# Patient Record
Sex: FEMALE | Race: WHITE | Employment: STUDENT | URBAN - METROPOLITAN AREA
[De-identification: names, ages, dates, MRNs, and addresses within clinical notes are randomized per-mention and may not be internally consistent; named-entity substitution may affect disease eponyms.]

---
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
## 2023-05-05 ENCOUNTER — OFFICE VISIT (OUTPATIENT)
Dept: FAMILY MEDICINE CLINIC | Facility: CLINIC | Age: 18
End: 2023-05-05
Payer: COMMERCIAL

## 2023-05-05 VITALS
WEIGHT: 160 LBS | HEIGHT: 70 IN | BODY MASS INDEX: 22.9 KG/M2 | SYSTOLIC BLOOD PRESSURE: 122 MMHG | RESPIRATION RATE: 16 BRPM | DIASTOLIC BLOOD PRESSURE: 84 MMHG | HEART RATE: 89 BPM | OXYGEN SATURATION: 99 %

## 2023-05-05 DIAGNOSIS — N92.6 IRREGULAR MENSES: ICD-10-CM

## 2023-05-05 DIAGNOSIS — N94.6 SEVERE MENSTRUAL CRAMPS: ICD-10-CM

## 2023-05-05 DIAGNOSIS — M41.25 IDIOPATHIC SCOLIOSIS OF THORACOLUMBAR REGION: ICD-10-CM

## 2023-05-05 DIAGNOSIS — B37.0 ORAL THRUSH: Primary | ICD-10-CM

## 2023-05-05 PROCEDURE — 99214 OFFICE O/P EST MOD 30 MIN: CPT | Performed by: PHYSICIAN ASSISTANT

## 2023-05-05 PROCEDURE — 3074F SYST BP LT 130 MM HG: CPT | Performed by: PHYSICIAN ASSISTANT

## 2023-05-05 PROCEDURE — 3079F DIAST BP 80-89 MM HG: CPT | Performed by: PHYSICIAN ASSISTANT

## 2023-05-05 PROCEDURE — 3008F BODY MASS INDEX DOCD: CPT | Performed by: PHYSICIAN ASSISTANT

## 2023-05-05 RX ORDER — FLUCONAZOLE 150 MG/1
TABLET ORAL
Qty: 2 TABLET | Refills: 0 | Status: SHIPPED | OUTPATIENT
Start: 2023-05-05

## 2023-05-05 RX ORDER — ACETAMINOPHEN AND CODEINE PHOSPHATE 120; 12 MG/5ML; MG/5ML
0.35 SOLUTION ORAL DAILY
Qty: 90 TABLET | Refills: 3 | Status: SHIPPED | OUTPATIENT
Start: 2023-05-05 | End: 2024-05-04

## 2023-05-05 NOTE — PROGRESS NOTES
Subjective:   Patient ID: Jose David Ignacio is a 21year old female. HPI   Patient presents c/o a few symptoms:    Dry mouth for 2 weeks  Feeling run down  Had before but never lasted this long  Feels like a lemon zester on her tongue  No other pain  Worse with eating crusty bread  Nothing helping like mouthwash or lozenges  No recent abx  Wears retainers    For the last several months, cycle is 1-1.5 weeks longer  Having safe sex  Gets worried that it is late  First day is extremely crampy, cannot get out of bed  Cramps lasting longer  Not heavier  No clots  Getting very bloated and constipation - getting worse  Acne is worse too  Did not like nuvaring - made her very depressed  Open to trying something new    Would like repeat xray for h/o scoliosis, last one was 2015        History/Other:   Review of Systems   Constitutional: Negative for chills, diaphoresis and fever. HENT: Positive for mouth sores (dryness and discomfort of tongue and throat). Genitourinary: Positive for menstrual problem. No current outpatient medications on file. Allergies:No Known Allergies    Objective:   Physical Exam  Vitals and nursing note reviewed. Constitutional:       Appearance: Normal appearance. HENT:      Head: Normocephalic and atraumatic. Right Ear: External ear normal.      Left Ear: External ear normal.      Mouth/Throat:      Mouth: Mucous membranes are moist.      Pharynx: Oropharyngeal exudate (yellow/white plaques of the tongue) and posterior oropharyngeal erythema (oropharynx, palate, buccal mucosa) present. Neurological:      Mental Status: She is alert. Assessment & Plan:   1. Oral thrush  Treat with medication as below. Follow up if not soon better. - fluconazole 150 MG Oral Tab; Take 1 tablet by mouth once. Repeat in 72 hours if symptoms persist.  Dispense: 2 tablet; Refill: 0    2. Irregular menses  3. Severe menstrual cramps  Could not tolerate nuvaring. Give trial of mini pill. Medication use and side effects discussed. Follow up in 3 months. Sooner prn.   - Norethindrone 0.35 MG Oral Tab; Take 1 tablet (0.35 mg total) by mouth daily. Dispense: 90 tablet; Refill: 3    4. Idiopathic scoliosis of thoracolumbar region  Repeat xray. Refer to ortho or for PT if needed. - XR SCOLIOSIS SPINE 2-3 VIEWS (CPT=72082);  Future

## 2023-05-11 ENCOUNTER — OFFICE VISIT (OUTPATIENT)
Dept: ENDOCRINOLOGY CLINIC | Facility: CLINIC | Age: 18
End: 2023-05-11
Payer: COMMERCIAL

## 2023-05-11 ENCOUNTER — TELEPHONE (OUTPATIENT)
Dept: ENDOCRINOLOGY CLINIC | Facility: CLINIC | Age: 18
End: 2023-05-11

## 2023-05-11 VITALS
BODY MASS INDEX: 22.3 KG/M2 | HEIGHT: 70 IN | HEART RATE: 93 BPM | WEIGHT: 155.81 LBS | SYSTOLIC BLOOD PRESSURE: 126 MMHG | DIASTOLIC BLOOD PRESSURE: 70 MMHG | OXYGEN SATURATION: 98 % | RESPIRATION RATE: 17 BRPM

## 2023-05-11 DIAGNOSIS — E10.65 TYPE 1 DIABETES MELLITUS WITH HYPERGLYCEMIA (HCC): Primary | ICD-10-CM

## 2023-05-11 LAB
CARTRIDGE LOT#: 587 NUMERIC
CREAT UR-SCNC: 138 MG/DL
HEMOGLOBIN A1C: 11.8 % (ref 4.3–5.6)
MICROALBUMIN UR-MCNC: 2.92 MG/DL
MICROALBUMIN/CREAT 24H UR-RTO: 21.2 UG/MG (ref ?–30)

## 2023-05-11 PROCEDURE — 99215 OFFICE O/P EST HI 40 MIN: CPT | Performed by: NURSE PRACTITIONER

## 2023-05-11 PROCEDURE — 82043 UR ALBUMIN QUANTITATIVE: CPT | Performed by: NURSE PRACTITIONER

## 2023-05-11 PROCEDURE — 82570 ASSAY OF URINE CREATININE: CPT | Performed by: NURSE PRACTITIONER

## 2023-05-11 PROCEDURE — 3008F BODY MASS INDEX DOCD: CPT | Performed by: NURSE PRACTITIONER

## 2023-05-11 PROCEDURE — 3074F SYST BP LT 130 MM HG: CPT | Performed by: NURSE PRACTITIONER

## 2023-05-11 PROCEDURE — 3078F DIAST BP <80 MM HG: CPT | Performed by: NURSE PRACTITIONER

## 2023-05-11 PROCEDURE — 83036 HEMOGLOBIN GLYCOSYLATED A1C: CPT | Performed by: NURSE PRACTITIONER

## 2023-05-11 RX ORDER — GLUCAGON INJECTION, SOLUTION 1 MG/.2ML
1 INJECTION, SOLUTION SUBCUTANEOUS ONCE AS NEEDED
Qty: 0.4 ML | Refills: 0 | Status: SHIPPED | OUTPATIENT
Start: 2023-05-11 | End: 2023-05-11

## 2023-05-11 RX ORDER — PEN NEEDLE, DIABETIC 32GX 5/32"
NEEDLE, DISPOSABLE MISCELLANEOUS
COMMUNITY
Start: 2023-05-09

## 2023-05-11 RX ORDER — BLOOD-GLUCOSE METER
EACH MISCELLANEOUS
COMMUNITY
Start: 2023-05-09

## 2023-05-11 RX ORDER — LANCETS
EACH MISCELLANEOUS
COMMUNITY
Start: 2023-05-09

## 2023-05-11 RX ORDER — INSULIN GLARGINE 100 [IU]/ML
INJECTION, SOLUTION SUBCUTANEOUS
COMMUNITY
Start: 2023-05-09

## 2023-05-11 RX ORDER — PERPHENAZINE 16 MG/1
TABLET, FILM COATED ORAL
COMMUNITY
Start: 2023-05-09

## 2023-05-11 RX ORDER — INSULIN LISPRO 100 [IU]/ML
INJECTION, SOLUTION INTRAVENOUS; SUBCUTANEOUS
COMMUNITY
Start: 2023-05-09

## 2023-05-11 RX ORDER — BLOOD-GLUCOSE SENSOR
1 EACH MISCELLANEOUS
Qty: 3 EACH | Refills: 11 | Status: SHIPPED | OUTPATIENT
Start: 2023-05-11

## 2023-05-11 NOTE — PROGRESS NOTES
A continuous glucose sensor for 24 hour blood sugar monitoring was placed on patient today per [employer] order. Serial NUMBER:[id number] Exp date: (6/30/24)  - After cleansing skin with alcohol prep, Sensor (G7)was inserted on  Right  Posterior upper arm area without difficulty. Small amount of skin prep was added around sensor tape after placement to help with sensor adhesive. Area remains free of any bleeding or irritation or pain at site when patient left DM center. Patient instructions:   Record daily food/drink intake and activity in log book  Call Diabetes center with any questions or concerns.    instructed to record food, exercise, insulin doses (if taking) on log provided

## 2023-05-11 NOTE — PATIENT INSTRUCTIONS
We are here to support you with Diabetes but please remember that you still need your primary care doctor for your routine health maintenance. Your current A1C: 11.8%  This test provides us with your average blood sugar for the past 3 months. The main goal of diabetes treatment is to keep your sugar from going too high. We measure your overall blood sugar trends with a Hemoglobin A1C test. (also called an A1C)  For most people the target is less than 7.0% but sometimes we make exceptions based on age, health history and other factors. Keeping an A1C less than 7% helps prevents diabetes related health problems. If your A1C goes too high, then we need to talk about changing your current diabetes treatment. MEDICATIONS:   It is important to take all of your medications as prescribed. Please call me if you cannot get the prescriptions filled or are having issues with refills. Also, please call me if you have any issues with medication questions, side effects, dosing questions or problems with your blood sugar trends BEFORE CHANGING OR STOPPING ANY MEDICATIONS. Continue Lantus 17 units injection 2x/day   Continue Humalog ICR 1u:15g and ISF 1u:50 mg/dl for target of 140 mg/dl    Blood sugar testing:   Always bring your glucose meter or blood sugar logbook to every appointment here at the diabetes center. This allows me to safely make adjustments to your diabetes plan. In order for me to determine any patterns in your blood sugars, you will need to use personal continuous glucose monitor or test your blood sugar 4 times daily   Call if 2 readings below 80 mg/dl in 1 week for medication adjustment. Blood sugar targets:  Before breakfast:   (preferably < 110)  2 hours After meals: less than 180 (preferably less than 150)   Call for persistent blood sugars < 75 or > 200. Blood sugars greater than 200 are not acceptable to reach your goal of improving diabetes      Health Maintenance:   1.  LABS: It is important to monitor your kidney function (blood and urine protein levels) , liver function tests and cholesterol levels when you have diabetes. 2. FOOT EXAMS:  daily foot inspections for foot wounds or skin changes are important for foot care. Any unusual changes should be reported immediately. 3. EYE EXAMS: Checking your eyes for diabetes changes is important and you should have a dilated eye exam done by an eye doctor EVERY year since these changes occur in the BACK of the eye and not visible by you. Please let me know if you need provider list for eye doctor. Lifestyle Therapy:    1. NUTRITION: Maintain optimal weight, calorie restriction if overweight, plant-based diet    2. PHYSICAL ACTIVITY: 150 minutes per week (30 minutes a day 5 days a week) of moderate exertion such as walking, stair climbing. Include strength training 2-3 times per week. Increase as tolerated. 3. SLEEP: Try and get 7-8 hours of sleep per night    4. BEHAVIOR:  Tobacco cessation and alcohol in moderation      Reminders:  Meet with Russell as scheduled  Meet with Biju Ugalde as scheduled    Brenda Company, BC-[facility], [facility]  [address], [address], [address], [address] [facility], [address]  [address]  Ella Vickers, [address] [place] [address]  Diabetes Services at [address] [facility] [address]

## 2023-05-18 ENCOUNTER — NURSE ONLY (OUTPATIENT)
Dept: ENDOCRINOLOGY CLINIC | Facility: CLINIC | Age: 18
End: 2023-05-18
Payer: COMMERCIAL

## 2023-05-18 VITALS — BODY MASS INDEX: 24 KG/M2 | WEIGHT: 166.38 LBS

## 2023-05-18 DIAGNOSIS — E10.9 TYPE 1 DIABETES MELLITUS WITHOUT COMPLICATION (HCC): Primary | ICD-10-CM

## 2023-05-18 PROCEDURE — G0108 DIAB MANAGE TRN  PER INDIV: HCPCS | Performed by: DIETITIAN, REGISTERED

## 2023-05-19 ENCOUNTER — DIABETIC EDUCATION (OUTPATIENT)
Dept: ENDOCRINOLOGY CLINIC | Facility: CLINIC | Age: 18
End: 2023-05-19
Payer: COMMERCIAL

## 2023-05-19 DIAGNOSIS — E10.65 TYPE 1 DIABETES MELLITUS WITH HYPERGLYCEMIA (HCC): ICD-10-CM

## 2023-05-19 PROCEDURE — 97802 MEDICAL NUTRITION INDIV IN: CPT

## 2023-05-19 NOTE — PATIENT INSTRUCTIONS
Recommendations:    Don't restrict but continue to follow a consistent carbohydrate diet of around 60 carbs per meal and increase as needed to match activity level. 3 meals daily with 2-3 snacks. Use measuring cups/spoons or a food scale at home to monitor portion sizes  Read food labels and use Calorie Anny Blue when counting carbs  As you return to physical activity start slow with moderate workouts and build up as your comfort level with your blood sugars grow  Delay physical activity with blood sugars over 300 mg/dL or with moderate to large ketones (purchase Ketone strips)  Blood glucose targets prior to exercise recommended to be 126-180 mg/dL   Consume 15-20 grams of carbohydrates before and after workout  Keep glucose tabs or fast acting carbohydrates with you for treatment of lows; for blood glucose levels less than 70 mg/dl, take 15 grams of fast acting carbohydrates (3-4 glucose tabs, 4 ounces of juice, or as discussed). Retest in 15 min. If not above 70 mg/dl, retreat. Purchase glucose tablets    Medications:  Continue to follow SHAWN Pope's instructions:  Lantus 17 units injection bid. Humalog ICR 1u: 15g and ISF 1u:50 mg/dl for target of 140 mg/dl. (Do not use the sliding scale)  Before vigorous physical activity do not correct blood sugars but dose for any carbs eaten  Your blood sugars will spike during your activity, do not dose right after your work out as your blood sugars will decrease on their own.

## 2023-05-20 ENCOUNTER — MOBILE ENCOUNTER (OUTPATIENT)
Dept: ENDOCRINOLOGY CLINIC | Facility: CLINIC | Age: 18
End: 2023-05-20

## 2023-05-20 RX ORDER — PERPHENAZINE 16 MG/1
TABLET, FILM COATED ORAL
Refills: 0 | OUTPATIENT
Start: 2023-05-20

## 2023-05-20 RX ORDER — BLOOD-GLUCOSE SENSOR
1 EACH MISCELLANEOUS
Qty: 3 EACH | Refills: 1 | Status: SHIPPED | OUTPATIENT
Start: 2023-05-20

## 2023-05-20 RX ORDER — LANCETS
EACH MISCELLANEOUS
Refills: 0 | OUTPATIENT
Start: 2023-05-20

## 2023-05-22 ENCOUNTER — NURSE ONLY (OUTPATIENT)
Dept: ENDOCRINOLOGY CLINIC | Facility: CLINIC | Age: 18
End: 2023-05-22
Payer: COMMERCIAL

## 2023-05-22 ENCOUNTER — TELEPHONE (OUTPATIENT)
Dept: ENDOCRINOLOGY CLINIC | Facility: CLINIC | Age: 18
End: 2023-05-22

## 2023-05-22 RX ORDER — BLOOD-GLUCOSE SENSOR
1 EACH MISCELLANEOUS
Qty: 3 EACH | Refills: 11 | Status: SHIPPED | OUTPATIENT
Start: 2023-05-22

## 2023-05-22 NOTE — TELEPHONE ENCOUNTER
Pt scheduled for 1pm nurse visit today in [facility] office to  [address] [facility] sample

## 2023-05-22 NOTE — TELEPHONE ENCOUNTER
Pt requesting Dexcom sample. States there is an issue with insurance and she has been unable to  from pharmacy. I will contat pharmacy to request PA information and begin PA process for future refils. Okay for pt to  sample today? She can  at NP or SPF location.

## 2023-05-23 ENCOUNTER — TELEMEDICINE (OUTPATIENT)
Dept: ENDOCRINOLOGY CLINIC | Facility: CLINIC | Age: 18
End: 2023-05-23
Payer: COMMERCIAL

## 2023-05-23 DIAGNOSIS — E10.65 TYPE 1 DIABETES MELLITUS WITH HYPERGLYCEMIA (HCC): Primary | ICD-10-CM

## 2023-05-23 PROCEDURE — 95251 CONT GLUC MNTR ANALYSIS I&R: CPT | Performed by: NURSE PRACTITIONER

## 2023-05-23 PROCEDURE — 99213 OFFICE O/P EST LOW 20 MIN: CPT | Performed by: NURSE PRACTITIONER

## 2023-05-23 NOTE — PATIENT INSTRUCTIONS
Decrease Lantus to 15 units injection 2x/day with breakfast and dinner    Decrease Humalog ICR to 1u:17g and ISF 1u:55 mg/dl for target of 140 mg/dl    Continue to use Dexcom G7 CGM for glucose monitoring    Contact office if 2 readings less than 80 mg/dl in one week for insulin adjustment

## 2023-05-23 NOTE — PROGRESS NOTES
Pt paged on call service this weekend asking for dexcom refill stating she called the office on Friday. Rx sent to pharmacy but reminded pt of giving the office at least 48 hours to fill rx which is not done on weekends. Pt verbalized understanding.

## 2023-05-24 RX ORDER — PEN NEEDLE, DIABETIC 32GX 5/32"
NEEDLE, DISPOSABLE MISCELLANEOUS
Qty: 100 EACH | Refills: 3 | Status: SHIPPED | OUTPATIENT
Start: 2023-05-24

## 2023-05-26 ENCOUNTER — APPOINTMENT (OUTPATIENT)
Dept: ENDOCRINOLOGY CLINIC | Facility: CLINIC | Age: 18
End: 2023-05-26
Payer: COMMERCIAL

## 2023-05-26 ENCOUNTER — TELEPHONE (OUTPATIENT)
Dept: ENDOCRINOLOGY CLINIC | Facility: CLINIC | Age: 18
End: 2023-05-26

## 2023-05-26 NOTE — TELEPHONE ENCOUNTER
Pt called regarding lows during the day and overnight. Spoke to Andres ESCOBAR and reviewed pt's Dexcom daily report. Pt was informed to lower Basal from 15 units BID to 12 units BID. Also discussed eating before and after vigorous activity. Pt also was given a box of 50 pen needles. The pharmacy told her that the previous MD from the hospital placed a date she could not refill by on the prescription. Report scanned into media.     Edson Root RDN, LDN, [facility]  Certified Diabetes Care and   Registered Dietitian

## 2023-05-30 ENCOUNTER — MED REC SCAN ONLY (OUTPATIENT)
Dept: FAMILY MEDICINE CLINIC | Facility: CLINIC | Age: 18
End: 2023-05-30

## 2023-06-01 ENCOUNTER — TELEPHONE (OUTPATIENT)
Dept: ENDOCRINOLOGY CLINIC | Facility: CLINIC | Age: 18
End: 2023-06-01

## 2023-06-01 NOTE — TELEPHONE ENCOUNTER
Pt called and asked for sample G7. Said she is having trouble with her [address] [facility] getting covered because \"hospital gave wrong diagnosis\" as T2DM, but she's T1D. I explained CAB sent new Rx to [facility] yesterday and that associated dx is listed as T1D, so she should call pharmacy to see if this goes through. If not, advised her to call back. Pt agreed.

## 2023-06-01 NOTE — TELEPHONE ENCOUNTER
Called [facility] to complete PA for Dexcom G7 6/1/23 thru 6/1/24 Auth [id number]. Called [place] to confirm Dexcom G7 is running through correctly. Confirmed G7 is approved and called patient to let her know. No sample needed. Pt will  at pharmacy.

## 2023-06-01 NOTE — TELEPHONE ENCOUNTER
Pt called back after speaking to the pharmacy and Dexcom G7 is still going through approval process. Pt G7 sensor expires today and she is requesting to  sample in NP office. Okay to provide sample? I will call the pharmacy to see what is needed from our office to get [address] [place] [address] approved through insurance.

## 2023-06-02 ENCOUNTER — TELEMEDICINE (OUTPATIENT)
Dept: ENDOCRINOLOGY CLINIC | Facility: CLINIC | Age: 18
End: 2023-06-02
Payer: COMMERCIAL

## 2023-06-02 ENCOUNTER — PATIENT MESSAGE (OUTPATIENT)
Dept: ENDOCRINOLOGY CLINIC | Facility: CLINIC | Age: 18
End: 2023-06-02

## 2023-06-02 DIAGNOSIS — E10.9 TYPE 1 DIABETES MELLITUS WITHOUT COMPLICATION (HCC): Primary | ICD-10-CM

## 2023-06-02 PROCEDURE — 99213 OFFICE O/P EST LOW 20 MIN: CPT | Performed by: NURSE PRACTITIONER

## 2023-06-02 RX ORDER — INSULIN GLARGINE 100 [IU]/ML
INJECTION, SOLUTION SUBCUTANEOUS
Qty: 45 ML | Refills: 0 | COMMUNITY
Start: 2023-06-02

## 2023-06-08 ENCOUNTER — TELEPHONE (OUTPATIENT)
Dept: ENDOCRINOLOGY CLINIC | Facility: CLINIC | Age: 18
End: 2023-06-08

## 2023-06-08 NOTE — TELEPHONE ENCOUNTER
Received [facility] for pump attached 2 OV notes labs for A1C and 30 days of glusocse reading.  Faxed off too [phone] confirmed   Sending to scan

## 2023-06-19 ENCOUNTER — TELEPHONE (OUTPATIENT)
Dept: ENDOCRINOLOGY CLINIC | Facility: CLINIC | Age: 18
End: 2023-06-19

## 2023-06-19 NOTE — TELEPHONE ENCOUNTER
Letter of Medical Necessity written, printed and signed by provider. To be faxed to Joseline Floyd at [phone].     Vicenet BOWEN, BC-ADM, [facility]

## 2023-06-26 ENCOUNTER — PATIENT MESSAGE (OUTPATIENT)
Dept: ENDOCRINOLOGY CLINIC | Facility: CLINIC | Age: 18
End: 2023-06-26

## 2023-06-26 RX ORDER — PROCHLORPERAZINE 25 MG/1
1 SUPPOSITORY RECTAL
Qty: 1 EACH | Refills: 3 | Status: SHIPPED | OUTPATIENT
Start: 2023-06-26

## 2023-06-26 RX ORDER — PROCHLORPERAZINE 25 MG/1
1 SUPPOSITORY RECTAL
Qty: 3 EACH | Refills: 11 | Status: SHIPPED | OUTPATIENT
Start: 2023-06-26

## 2023-06-26 NOTE — TELEPHONE ENCOUNTER
Pt going on tslim pump, dexcom G6 supplies pended. LOV: 06/02/2023  Future Appointments   Date Time Provider Julio Cesar Olivares   7/14/2023  2:15 PM SHAWN Pederson [facility]   7/28/2023  9:00 AM JESSI Lowry [facility] [address]     Last A1c value was 11.8% done 5/11/2023.

## 2023-06-28 ENCOUNTER — PATIENT MESSAGE (OUTPATIENT)
Dept: FAMILY MEDICINE CLINIC | Facility: CLINIC | Age: 18
End: 2023-06-28

## 2023-06-28 DIAGNOSIS — Z13.0 ENCOUNTER FOR SCREENING FOR HEMATOLOGIC DISORDER: Primary | ICD-10-CM

## 2023-06-29 NOTE — TELEPHONE ENCOUNTER
Pt's pump shipped - sending pump setting form to Elena to be completed. Requesting completed form be faxed to Sherrell Munroe at [phone]. Send copy to scan and email scan back to me. Sherrell Munroe will work on arranging pump training.

## 2023-06-30 NOTE — TELEPHONE ENCOUNTER
Pump setting orders completed by provider. Will fax to Ignacia Argueta on Monday when return to office.     Arianna Garcia APRN, BC-ADM, [facility]

## 2023-07-10 ENCOUNTER — NURSE ONLY (OUTPATIENT)
Dept: ENDOCRINOLOGY CLINIC | Facility: CLINIC | Age: 18
End: 2023-07-10
Payer: COMMERCIAL

## 2023-07-11 ENCOUNTER — PATIENT MESSAGE (OUTPATIENT)
Dept: ENDOCRINOLOGY CLINIC | Facility: CLINIC | Age: 18
End: 2023-07-11

## 2023-07-11 NOTE — TELEPHONE ENCOUNTER
Contacted pt to discuss issues with blood sugar fluctuations occurring overnight. Reviewed most recent Tandem Tslim pump download with JESSI Guerra - recommended setting adjustments:    Basal Rate: Current  Adjustment  Midnight 0.580 u/hr 0.50 u/hr  Carb Ratio 1u:17 g  1u:34 g    Discussed adjustments with pt and walked her through over the phone how to make the setting adjustments as recommended above. Had pt read back setting changes to me. Also discussed using the Exercise Activity feature 1 hour before exercise. Encouraged her to continue to allow the pump/system to have time to work over the next few days. Advised her to contact our office if she experiences frequent high or low blood sugars.

## 2023-07-13 ENCOUNTER — TELEMEDICINE (OUTPATIENT)
Dept: ENDOCRINOLOGY CLINIC | Facility: CLINIC | Age: 18
End: 2023-07-13
Payer: COMMERCIAL

## 2023-07-13 DIAGNOSIS — E10.9 TYPE 1 DIABETES MELLITUS WITHOUT COMPLICATION (HCC): Primary | ICD-10-CM

## 2023-07-13 DIAGNOSIS — Z96.41 INSULIN PUMP IN PLACE: ICD-10-CM

## 2023-07-13 PROCEDURE — 95251 CONT GLUC MNTR ANALYSIS I&R: CPT | Performed by: NURSE PRACTITIONER

## 2023-07-13 PROCEDURE — 99213 OFFICE O/P EST LOW 20 MIN: CPT | Performed by: NURSE PRACTITIONER

## 2023-07-13 NOTE — PROGRESS NOTES
HPI:   Nora Ayala is a 21year old female who presents virtually for the management of her diabetes. This visit is conducted using Telemedicine with live, two way, interactive video and audio. Patient verbally consents to Telemedicine visit. Patient understands and accepts financial responsibility for any deductible, co-insurance and/or co-pays associated with this service. Chief Complaint: Diabetes Follow Up    Patient is using continuous glucose monitoring or would be testing BGs at least 4 times per day. Patient is on at least 3 insulin injections per day. Patient is making self-adjustments in insulin according to blood sugars or carbs. Diabetes: needs improvement  Type: 1   Duration:newly diagnosed, May 2023  Current Meds: Humalog via insulin pump, Lantus for insulin pump back up plan, Gvoke for tx of severe hypoglycemia  Complications: Hospitalizations for blood glucose, DKA     Tandem T-slim Insulin pump w/Control IQ  Control IQ Use: 74%    Basal rates:   12MN: 0.5 units/hour     Bolus settings:   Max bolus: 10 units     Carbohydrate ratio:   12MN: 1u:34g    Insulin sensitivity:  12MN: 1u:63 mg/dl    BG target:  12MN: 110 mg/dl    Active insulin: 5 hours     TDD insulin: 6.16 units   Basal: 3.87 units (63%)  Bolus: 2.07 units (34%)  Average daily carbs: 47g    Personal  Dexcom CGM  Analysis of data: 6/30/2023 - 7/13/2023  % Time CGM is Active: 98.8%  Sensor download: full report  in media  Average glucose : 127 mg/dl     Standard deviation: 32 mg/dl   CV (coefficient of variation) : 25.1%     8% time above 180mg /dl   0% time above 250 mg/dl  92% time in target range:  mg/dl   0% time below target range: 70mg/dl     Evaluation   1. Increased likelihood of hypoglycemia postprandial  2. Overnight glucose mostly stable and in target range  3. Minimal postprandial elevation  4.  Normal glucose variability      Overall glucose control:   HGBA1C:    Lab Results   Component Value Date    A1C 11.8 (A) 05/11/2023          Wt Readings from Last 3 Encounters:  05/18/23 : 166 lb 6.4 oz (75.5 kg) (91 %, Z= 1.32)*  05/11/23 : 155 lb 12.8 oz (70.7 kg) (85 %, Z= 1.05)*  05/05/23 : 160 lb (72.6 kg) (88 %, Z= 1.16)*    * Growth percentiles are based on [facility] (Girls, 2-20 Years) data. Past History:   She  has a past medical history of Type 1 diabetes mellitus ([facility] [address] Her family history includes Diabetes in her paternal grandmother; Thyroid Disorder in her maternal grandmother. She  reports that she has never smoked. She has never used smokeless tobacco. She reports that she does not drink alcohol and does not use drugs. She has No Known Allergies. Continuous Blood Gluc Transmit (DEXCOM G6 TRANSMITTER) Does not apply Misc, 1 each every 3 (three) months. Continuous Blood Gluc Sensor (DEXCOM G6 SENSOR) Does not apply Misc, 1 each Every 10 days. insulin glargine (LANTUS SOLOSTAR) 100 UNIT/ML Subcutaneous Solution Pen-injector, Inject daily as directed up to TDD = 30 units  FOR INSULIN PUMP BACK UP ONLY  Insulin Lispro, 1 Unit Dial, (HUMALOG KWIKPEN) 100 UNIT/ML Subcutaneous Solution Pen-injector, Uses up to 50 units daily as directed based on carb intake and blood sugars at meals/snacks  [DISCONTINUED] Continuous Blood Gluc Sensor (DEXCOM G7 SENSOR) Does not apply Misc, 1 each Every 10 days. TRUEPLUS 5-BEVEL PEN NEEDLES 32G X 4 MM Does not apply Misc, Use as directed  Microlet Lancets Does not apply Misc,   CONTOUR NEXT TEST In Vitro Strip,   Blood Glucose Monitoring Suppl (CONTOUR NEXT EZ) w/Device Does not apply Kit,   glucagon (GVOKE HYPOPEN 2-PACK) 1 MG/0.2ML Subcutaneous SUBQ injection, Inject 0.2 mL (1 mg total) into the skin once as needed for Low blood glucose (as needed for treatment of severe hypoglycemia). Norethindrone 0.35 MG Oral Tab, Take 1 tablet (0.35 mg total) by mouth daily. No current facility-administered medications on file prior to visit.       CMP  (most recent labs)   No results found for: GLU, BUN, CREATSERUM, GFRNAA, GFRAA, EGFRCR, CA, ALKPHO, AST, ALT, BILT, TP, ALB, NA, K, CL, CO2        Lipids  (most recent labs)   No results found for: CHOLEST, TRIG, HDL, LDL, NONHDLC       Diabetes  (most recent labs)   Lab Results   Component Value Date/Time    A1C 11.8 (A) 05/11/2023 08:27 AM          Microalb (most recent labs)   Lab Results   Component Value Date/Time    MICROALBCREA 21.2 05/11/2023 09:31 AM        Lab results reviewed with patient. REVIEW OF SYSTEMS:   GENERAL HEALTH: feels well otherwise  SKIN: denies any unusual skin lesions or rashes  RESPIRATORY: denies shortness of breath with exertion  CARDIOVASCULAR: denies chest pain on exertion  GI: denies abdominal pain and denies heartburn  NEURO: denies headaches     EXAM:   Physical Exam  Pulmonary:      Comments: Able to speak in complete sentences without difficulty  Neurological:      Mental Status: She is alert and oriented to person, place, and time. Psychiatric:         Mood and Affect: Mood normal.         Behavior: Behavior normal.         ASSESSMENT AND PLAN:   Diabetes: Patient to continue Humalog via insulin pump, Lantus for insulin pump back up plan and Gvoke for treatment of severe hypoglycemia. Adjustments to insulin pump settings:  Tandem T-slim Insulin pump w/Control IQ  Basal rates:   12MN: 0.45 units/hour (decreased from 0.5 units/hour)     Bolus settings:   Max bolus: 10 units     Carbohydrate ratio:   12MN: 1u:35g (decreased from 1u:34g)    Insulin sensitivity:  12MN: 1u: 65 mg/dl (decreased from 1u:63 mg/dl)    BG target:  12MN: 110 mg/dl    Active insulin: 5 hours     Insulin pump back up plan: Give Lantus 10 units injection daily and continue Humalog ICR 1u:35g and ISF 1u:65 mg/dl for target of 110 mg/dl. For mechanical issues contact [employer]. Patient to continue to use personal Dexcom CGM for glucose monitoring. Discussed self monitoring blood glucose and the importance of monitoring.   Patient agreed to monitoring qid - before meals and 2 hours postprandial of one meal per day if not using CGM. Hypoglycemia S&S, Rx, and when to call APRN/CDE reviewed using Rule of 15's. Stressed need to call if 2 readings below 80 mg/dl in 1 week for medication adjustment. Reinforced healthy eating in healthy portions and increasing daily physical activity. DM Health Maintenance  Last dilated eye exam: No data recorded Exam shows retinopathy? No data recorded  Last diabetic foot exam: No data recorded  Date of last PHQ-2 depression screen: No data recorded  Patient  reports that she has never smoked. She has never used smokeless tobacco.  When is flu vaccine due? No recommendations at this time  When is pneumonia vaccine due? Pneumococcal Vaccination(1 - PPSV23 if available, else PCV20) due on 03/15/2005    The patient indicates understanding of these issues and agrees to the plan. Refills addressed at time of office visit. Diagnoses and all orders for this visit:    Type 1 diabetes mellitus without complication (HCC)    Insulin pump in place       Return in about 8 days (around 7/21/2023) for Personal CGM, Insulin pump, 45 minute appointment, Virtual Visit. The risks and benefits of my recommendations, as well as other treatment options were discussed with the patient today. questions were answered to the best of my knowledge. Patient verbalizes understanding and amendable to plan of care.   Duration of this service:  16 minutes   Rony BOWEN, BC-ADM, [facility]

## 2023-07-14 ENCOUNTER — LAB ENCOUNTER (OUTPATIENT)
Dept: LAB | Age: 18
End: 2023-07-14
Attending: FAMILY MEDICINE
Payer: COMMERCIAL

## 2023-07-14 DIAGNOSIS — Z13.0 ENCOUNTER FOR SCREENING FOR HEMATOLOGIC DISORDER: ICD-10-CM

## 2023-07-14 LAB — HGB S BLD QL SOLY: NEGATIVE

## 2023-07-14 PROCEDURE — 36415 COLL VENOUS BLD VENIPUNCTURE: CPT

## 2023-07-14 PROCEDURE — 85660 RBC SICKLE CELL TEST: CPT

## 2023-07-19 DIAGNOSIS — N92.6 IRREGULAR MENSES: ICD-10-CM

## 2023-07-19 DIAGNOSIS — N94.6 SEVERE MENSTRUAL CRAMPS: ICD-10-CM

## 2023-07-20 RX ORDER — PEN NEEDLE, DIABETIC 32GX 5/32"
NEEDLE, DISPOSABLE MISCELLANEOUS
Qty: 100 EACH | Refills: 3 | Status: SHIPPED | OUTPATIENT
Start: 2023-07-20

## 2023-07-20 RX ORDER — ACETAMINOPHEN AND CODEINE PHOSPHATE 120; 12 MG/5ML; MG/5ML
0.35 SOLUTION ORAL DAILY
Qty: 90 TABLET | Refills: 0 | Status: SHIPPED | OUTPATIENT
Start: 2023-07-20 | End: 2024-07-19

## 2023-07-20 RX ORDER — PROCHLORPERAZINE 25 MG/1
1 SUPPOSITORY RECTAL
Qty: 3 EACH | Refills: 11 | Status: SHIPPED | OUTPATIENT
Start: 2023-07-20

## 2023-07-20 NOTE — TELEPHONE ENCOUNTER
Requested Prescriptions     Pending Prescriptions Disp Refills    Continuous Blood Gluc Sensor (DEXCOM G6 SENSOR) Does not apply Misc 3 each 11     Si each Every 10 days. Future Appointments   Date Time Provider Julio Cesar Elise   2023  4:15 PM SHAWN Huff [facility]   2023  1:00 PM Charlie Amato RD [facility] [address]   2023  9:00 AM JESSI De La Torre [facility] [address]     Last A1c value was 11.8% done 2023.   Refill 23  LOV 23

## 2023-07-20 NOTE — TELEPHONE ENCOUNTER
A refill request was received for:  Requested Prescriptions     Pending Prescriptions Disp Refills    TRUEPLUS 5-BEVEL PEN NEEDLES 32G X 4 MM Does not apply Misc 100 each 3     Sig: Use as directed       Last refill date:5/24/2023       Last office visit:7/27/2023     Follow up due:  Future Appointments   Date Time Provider Julio Cesar Olivares   7/21/2023  4:15 PM SHAWN Huff [facility]   7/27/2023  1:00 PM Charlie Amato RD [facility] [address]   7/28/2023  9:00 AM JESSI De La Torre [facility] [address] [facility] [address]

## 2023-07-21 ENCOUNTER — TELEMEDICINE (OUTPATIENT)
Dept: ENDOCRINOLOGY CLINIC | Facility: CLINIC | Age: 18
End: 2023-07-21
Payer: COMMERCIAL

## 2023-07-21 DIAGNOSIS — Z96.41 INSULIN PUMP IN PLACE: ICD-10-CM

## 2023-07-21 DIAGNOSIS — E10.9 TYPE 1 DIABETES MELLITUS WITHOUT COMPLICATION (HCC): Primary | ICD-10-CM

## 2023-07-21 PROCEDURE — 99213 OFFICE O/P EST LOW 20 MIN: CPT | Performed by: NURSE PRACTITIONER

## 2023-07-21 NOTE — PROGRESS NOTES
HPI:   Herber Townsend is a 21year old female who presents virtually for the management of her diabetes. This visit is conducted using Telemedicine with live, two way, interactive video and audio. Patient verbally consents to Telemedicine visit. Patient understands and accepts financial responsibility for any deductible, co-insurance and/or co-pays associated with this service. Chief Complaint: Diabetes Follow Up    Patient is using continuous glucose monitoring or would be testing BGs at least 4 times per day. Patient is on at least 3 insulin injections per day. Patient is making self-adjustments in insulin according to blood sugars or carbs. Diabetes: needs improvement  Type: 1   Duration:newly diagnosed, May 2023  Current Meds: Humalog via insulin pump, Lantus for insulin pump back up plan, Gvoke for tx of severe hypoglycemia  Complications: Hospitalizations for blood glucose, DKA     Tandem T-slim Insulin pump w/Control IQ  Control IQ Use: 90%    Basal rates:   12MN: 0.45 units/hour     Bolus settings:   Max bolus: 10 units     Carbohydrate ratio:   12MN: 1u:35g    Insulin sensitivity:  12MN: 1u:65 mg/dl    BG target:  12MN: 110 mg/dl    Active insulin: 5 hours     TDD insulin: 9.09 units   Basal: 6.59 units (72%)  Bolus: 2.5 units (28%)  Average daily carbs: 48g    Personal  Dexcom CGM  Analysis of data: 7/7/2023 - 7/20/2023  % Time CGM is Active: 99.5%  Sensor download: full report  in media  Average glucose : 129 mg/dl     Standard deviation: 27 mg/dl   CV (coefficient of variation) : 20.8%     5% time above 180mg /dl   0% time above 250 mg/dl  95% time in target range:  mg/dl   0% time below target range: 70mg/dl     Evaluation   1. Some likelihood of postprandial hypoglycemia  2. Baseline glucose stable and in target range  3. Minimal postprandial elevation  4.  Normal glucose variability      Overall glucose control:   HGBA1C:    Lab Results   Component Value Date    A1C 11.8 (A) 05/11/2023 Wt Readings from Last 3 Encounters:  05/18/23 : 166 lb 6.4 oz (75.5 kg) (91 %, Z= 1.32)*  05/11/23 : 155 lb 12.8 oz (70.7 kg) (85 %, Z= 1.05)*  05/05/23 : 160 lb (72.6 kg) (88 %, Z= 1.16)*    * Growth percentiles are based on [facility] (Girls, 2-20 Years) data. Past History:   She  has a past medical history of Type 1 diabetes mellitus ([address] Her family history includes Diabetes in her paternal grandmother; Thyroid Disorder in her maternal grandmother. She  reports that she has never smoked. She has never used smokeless tobacco. She reports that she does not drink alcohol and does not use drugs. She has No Known Allergies. TRUEPLUS 5-BEVEL PEN NEEDLES 32G X 4 MM Does not apply Misc, Use as directed  Continuous Blood Gluc Sensor (DEXCOM G6 SENSOR) Does not apply Misc, 1 each Every 10 days. Norethindrone 0.35 MG Oral Tab, Take 1 tablet (0.35 mg total) by mouth daily. Continuous Blood Gluc Transmit (DEXCOM G6 TRANSMITTER) Does not apply Misc, 1 each every 3 (three) months. [DISCONTINUED] Continuous Blood Gluc Sensor (DEXCOM G6 SENSOR) Does not apply Misc, 1 each Every 10 days. insulin glargine (LANTUS SOLOSTAR) 100 UNIT/ML Subcutaneous Solution Pen-injector, Inject daily as directed up to TDD = 30 units  FOR INSULIN PUMP BACK UP ONLY  Insulin Lispro, 1 Unit Dial, (HUMALOG KWIKPEN) 100 UNIT/ML Subcutaneous Solution Pen-injector, Uses up to 50 units daily as directed based on carb intake and blood sugars at meals/snacks  Microlet Lancets Does not apply Misc,   CONTOUR NEXT TEST In Vitro Strip,   Blood Glucose Monitoring Suppl (CONTOUR NEXT EZ) w/Device Does not apply Kit,   glucagon (GVOKE HYPOPEN 2-PACK) 1 MG/0.2ML Subcutaneous SUBQ injection, Inject 0.2 mL (1 mg total) into the skin once as needed for Low blood glucose (as needed for treatment of severe hypoglycemia). [DISCONTINUED] Norethindrone 0.35 MG Oral Tab, Take 1 tablet (0.35 mg total) by mouth daily.     No current facility-administered medications on file prior to visit. CMP  (most recent labs)   No results found for: GLU, BUN, CREATSERUM, GFRNAA, GFRAA, EGFRCR, CA, ALKPHO, AST, ALT, BILT, TP, ALB, NA, K, CL, CO2        Lipids  (most recent labs)   No results found for: CHOLEST, TRIG, HDL, LDL, NONHDLC       Diabetes  (most recent labs)   Lab Results   Component Value Date/Time    A1C 11.8 (A) 05/11/2023 08:27 AM          Microalb (most recent labs)   Lab Results   Component Value Date/Time    MICROALBCREA 21.2 05/11/2023 09:31 AM        Lab results reviewed with patient. REVIEW OF SYSTEMS:   GENERAL HEALTH: feels well otherwise  SKIN: denies any unusual skin lesions or rashes  RESPIRATORY: denies shortness of breath with exertion  CARDIOVASCULAR: denies chest pain on exertion  GI: denies abdominal pain and denies heartburn  NEURO: denies headaches     EXAM:   Physical Exam  Pulmonary:      Comments: Able to speak in complete sentences without difficulty  Neurological:      Mental Status: She is alert and oriented to person, place, and time. Psychiatric:         Mood and Affect: Mood normal.         Behavior: Behavior normal.         ASSESSMENT AND PLAN:   Diabetes: Patient to continue Humalog via insulin pump, Lantus for insulin pump back up plan and Gvoke for treatment of severe hypoglycemia. Adjustments to insulin pump settings:  Tandem T-slim Insulin pump w/Control IQ  Basal rates:   12MN: 0.45 units/hour     Bolus settings:   Max bolus: 10 units     Carbohydrate ratio:   12MN: 1u:38g (decreased from 1u:35g)    Insulin sensitivity:  12MN: 1u: 65 mg/dl     BG target:  12MN: 110 mg/dl    Active insulin: 5 hours     Insulin pump back up plan: Give Lantus 10 units injection daily and continue Humalog ICR 1u:35g and ISF 1u:65 mg/dl for target of 110 mg/dl. For mechanical issues contact Tandem. Patient to continue to use personal Dexcom CGM for glucose monitoring.   Discussed self monitoring blood glucose and the importance of monitoring. Patient agreed to monitoring qid - before meals and 2 hours postprandial of one meal per day if not using CGM. Hypoglycemia S&S, Rx, and when to call APRN/CDE reviewed using Rule of 15's. Stressed need to call if 2 readings below 80 mg/dl in 1 week for medication adjustment. Reinforced healthy eating in healthy portions and increasing daily physical activity. Patient will have labs done in the next week. Patient leaving for out of state college. She will contact provider if concerns and will notify of change in pharmacy. DM Health Maintenance  Last dilated eye exam: No data recorded Exam shows retinopathy? No data recorded  Last diabetic foot exam: No data recorded  Date of last PHQ-2 depression screen: No data recorded  Patient  reports that she has never smoked. She has never used smokeless tobacco.  When is flu vaccine due? No recommendations at this time  When is pneumonia vaccine due? Pneumococcal Vaccination(1 - PPSV23 if available, else PCV20) due on 03/15/2005    The patient indicates understanding of these issues and agrees to the plan. Refills addressed at time of office visit. Diagnoses and all orders for this visit:    Type 1 diabetes mellitus without complication (HCC)  -     COMP METABOLIC PANEL (14); Future  -     HEMOGLOBIN A1C; Future  -     LIPID PANEL; Future    Insulin pump in place       Return in about 5 months (around 12/19/2023) for 45 minute appointment, Personal CGM, Insulin pump. The risks and benefits of my recommendations, as well as other treatment options were discussed with the patient today. questions were answered to the best of my knowledge. Patient verbalizes understanding and amendable to plan of care.   Duration of this service:  16 minutes   Sree BOWEN, BC-ADM, [facility]

## 2023-07-27 ENCOUNTER — LAB ENCOUNTER (OUTPATIENT)
Dept: LAB | Age: 18
End: 2023-07-27
Attending: NURSE PRACTITIONER
Payer: COMMERCIAL

## 2023-07-27 ENCOUNTER — DIABETIC EDUCATION (OUTPATIENT)
Dept: ENDOCRINOLOGY CLINIC | Facility: CLINIC | Age: 18
End: 2023-07-27
Payer: COMMERCIAL

## 2023-07-27 VITALS — BODY MASS INDEX: 24 KG/M2 | WEIGHT: 170 LBS

## 2023-07-27 DIAGNOSIS — E10.9 TYPE 1 DIABETES MELLITUS WITHOUT COMPLICATION (HCC): ICD-10-CM

## 2023-07-27 DIAGNOSIS — E10.9 TYPE 1 DIABETES MELLITUS WITHOUT COMPLICATION (HCC): Primary | ICD-10-CM

## 2023-07-27 LAB
ALBUMIN SERPL-MCNC: 3.9 G/DL (ref 3.4–5)
ALBUMIN/GLOB SERPL: 1.1 {RATIO} (ref 1–2)
ALP LIVER SERPL-CCNC: 52 U/L
ALT SERPL-CCNC: 17 U/L
ANION GAP SERPL CALC-SCNC: 4 MMOL/L (ref 0–18)
AST SERPL-CCNC: 19 U/L (ref 15–37)
BILIRUB SERPL-MCNC: 1.3 MG/DL (ref 0.1–2)
BUN BLD-MCNC: 13 MG/DL (ref 7–18)
CALCIUM BLD-MCNC: 9.2 MG/DL (ref 8.5–10.1)
CHLORIDE SERPL-SCNC: 108 MMOL/L (ref 98–112)
CHOLEST SERPL-MCNC: 222 MG/DL (ref ?–200)
CO2 SERPL-SCNC: 26 MMOL/L (ref 21–32)
CREAT BLD-MCNC: 0.84 MG/DL
EGFRCR SERPLBLD CKD-EPI 2021: 103 ML/MIN/1.73M2 (ref 60–?)
EST. AVERAGE GLUCOSE BLD GHB EST-MCNC: 134 MG/DL (ref 68–126)
FASTING PATIENT LIPID ANSWER: YES
FASTING STATUS PATIENT QL REPORTED: YES
GLOBULIN PLAS-MCNC: 3.5 G/DL (ref 2.8–4.4)
GLUCOSE BLD-MCNC: 104 MG/DL (ref 70–99)
HBA1C MFR BLD: 6.3 % (ref ?–5.7)
HDLC SERPL-MCNC: 64 MG/DL (ref 40–59)
LDLC SERPL CALC-MCNC: 147 MG/DL (ref ?–100)
NONHDLC SERPL-MCNC: 158 MG/DL (ref ?–130)
OSMOLALITY SERPL CALC.SUM OF ELEC: 286 MOSM/KG (ref 275–295)
POTASSIUM SERPL-SCNC: 4 MMOL/L (ref 3.5–5.1)
PROT SERPL-MCNC: 7.4 G/DL (ref 6.4–8.2)
SODIUM SERPL-SCNC: 138 MMOL/L (ref 136–145)
TRIGL SERPL-MCNC: 65 MG/DL (ref 30–149)
VLDLC SERPL CALC-MCNC: 12 MG/DL (ref 0–30)

## 2023-07-27 PROCEDURE — 3044F HG A1C LEVEL LT 7.0%: CPT | Performed by: NURSE PRACTITIONER

## 2023-07-27 PROCEDURE — 83036 HEMOGLOBIN GLYCOSYLATED A1C: CPT

## 2023-07-27 PROCEDURE — 36415 COLL VENOUS BLD VENIPUNCTURE: CPT

## 2023-07-27 PROCEDURE — 80061 LIPID PANEL: CPT

## 2023-07-27 PROCEDURE — 80053 COMPREHEN METABOLIC PANEL: CPT

## 2023-07-27 PROCEDURE — G0108 DIAB MANAGE TRN  PER INDIV: HCPCS

## 2023-07-27 NOTE — PROGRESS NOTES
Ute Heard  : 12/10/2005 was seen for Individual Insulin Pump Follow up:    Date: 2023   Start time: 1:00 pm End time: 1:30 pm      Visit Summary: Pt notes she is much more comfortable with the pump. Since her last pump changes after seeing Suly Tejeda, she feels she is not overriding the pump as much. He remaining concerns are boluses over 140 mg/dL when in exercise mode, stopping IQ bolus before exercising, on the night of  her pump went up to 300 and she did not receive an alarm. Enrique Mccartney will follow up with Tandem and pt. Reviewed pump settings:   Basal rate(s): .450 u/hr   I:C 1u:38g     CF: 1u:65 mg/dL   Target goals: Daytime 110  Bedtime: Uses sleep setting   Active insulin time: 5 hrs      Evaluated pump download:  Tandem Tslim  Average Blood Glucose (mg/dL):   Standard Deviation: +/- 34 (per Dexcom)       96% Time in range  2% Time below range  2% Time above range    TDD: 12.56  %TDD Basal: 9.18%  %TDD Bolus: 3.39%    Overriding pump? yes  Pump/CGM data printout sent in for scanning  RECOMMENDATIONS:  Continue to review carbohydrate amounts for meals   Bolus for meals before you start the meal      Pump setting changes:  No change recommended    Reviewed pump back up plan. Patient Goals/Recommendations: Patient chosen goals included in patient's instructions for today. Follow up appt set for:   Future Appointments   Date Time Provider Julio Cesar Olivares   2023  1:00 PM SHAWN Oscar [facility]         Patient verbalized understanding and has no further questions at this time.       Chelsea Degroot, RDN, LDN, Enrique Mccartney  Certified Diabetes Care and   Registered Dietitian

## 2023-07-28 ENCOUNTER — OFFICE VISIT (OUTPATIENT)
Dept: INTERNAL MEDICINE CLINIC | Facility: CLINIC | Age: 18
End: 2023-07-28
Payer: COMMERCIAL

## 2023-07-28 VITALS
TEMPERATURE: 98 F | WEIGHT: 167 LBS | OXYGEN SATURATION: 97 % | HEIGHT: 70 IN | BODY MASS INDEX: 23.91 KG/M2 | SYSTOLIC BLOOD PRESSURE: 114 MMHG | HEART RATE: 91 BPM | RESPIRATION RATE: 16 BRPM | DIASTOLIC BLOOD PRESSURE: 70 MMHG

## 2023-07-28 DIAGNOSIS — E10.9 CONTROLLED DIABETES MELLITUS TYPE 1 WITHOUT COMPLICATIONS (HCC): ICD-10-CM

## 2023-07-28 DIAGNOSIS — Z02.5 ROUTINE SPORTS PHYSICAL EXAM: Primary | ICD-10-CM

## 2023-07-28 PROBLEM — R09.81 NASAL CONGESTION: Status: RESOLVED | Noted: 2022-03-03 | Resolved: 2023-07-28

## 2023-07-28 PROBLEM — J34.2 NASAL SEPTAL DEVIATION: Status: RESOLVED | Noted: 2022-03-03 | Resolved: 2023-07-28

## 2023-07-28 PROBLEM — J32.0 CHRONIC MAXILLARY SINUSITIS: Status: RESOLVED | Noted: 2022-03-03 | Resolved: 2023-07-28

## 2023-07-28 PROCEDURE — 90677 PCV20 VACCINE IM: CPT | Performed by: NURSE PRACTITIONER

## 2023-07-28 PROCEDURE — 3074F SYST BP LT 130 MM HG: CPT | Performed by: NURSE PRACTITIONER

## 2023-07-28 PROCEDURE — 3008F BODY MASS INDEX DOCD: CPT | Performed by: NURSE PRACTITIONER

## 2023-07-28 PROCEDURE — 3078F DIAST BP <80 MM HG: CPT | Performed by: NURSE PRACTITIONER

## 2023-07-28 PROCEDURE — 99395 PREV VISIT EST AGE 18-39: CPT | Performed by: NURSE PRACTITIONER

## 2023-07-28 PROCEDURE — 90471 IMMUNIZATION ADMIN: CPT | Performed by: NURSE PRACTITIONER

## 2023-07-31 ENCOUNTER — PATIENT MESSAGE (OUTPATIENT)
Dept: ENDOCRINOLOGY CLINIC | Facility: CLINIC | Age: 18
End: 2023-07-31

## 2023-08-01 NOTE — TELEPHONE ENCOUNTER
LOV:07/21/2023  Future Appointments   Date Time Provider Julio Cesar Elise   12/19/2023  1:00 PM SHAWN Lindsey [facility]     Last A1c value was 6.3% done 7/27/2023.

## 2023-08-03 DIAGNOSIS — E10.9 TYPE 1 DIABETES MELLITUS WITHOUT COMPLICATION (HCC): ICD-10-CM

## 2023-08-03 NOTE — TELEPHONE ENCOUNTER
Requested Prescriptions     Pending Prescriptions Disp Refills    Insulin Lispro, 1 Unit Dial, (HUMALOG KWIKPEN) 100 UNIT/ML Subcutaneous Solution Pen-injector 45 mL 0     Sig: Uses up to 50 units daily as directed based on carb intake and blood sugars at meals/snacks     Your appointments       Date & Time Appointment Department [facility])    Dec 19, 2023  1:00 PM CST Diabetes Pump follow up with SHAWN Salmon [facility] [address] ([facility] [place])              [facility] [address] [place]  [facility] [place]  [address] [address] [phone]          Last A1c value was 6.3% done 7/27/2023.     Refill 5/31/23  LOV 7/21/23 telemed

## 2023-08-04 RX ORDER — INSULIN GLARGINE 100 [IU]/ML
INJECTION, SOLUTION SUBCUTANEOUS
Qty: 45 ML | Refills: 0 | Status: SHIPPED | OUTPATIENT
Start: 2023-08-04

## 2023-08-04 RX ORDER — INSULIN LISPRO 100 [IU]/ML
INJECTION, SOLUTION INTRAVENOUS; SUBCUTANEOUS
Qty: 45 ML | Refills: 0 | Status: SHIPPED | OUTPATIENT
Start: 2023-08-04

## 2023-08-14 ENCOUNTER — TELEPHONE (OUTPATIENT)
Dept: ENDOCRINOLOGY CLINIC | Facility: CLINIC | Age: 18
End: 2023-08-14

## 2023-08-14 RX ORDER — ACYCLOVIR 400 MG/1
1 TABLET ORAL
Qty: 3 EACH | Refills: 11 | Status: SHIPPED | OUTPATIENT
Start: 2023-08-14

## 2023-08-14 NOTE — TELEPHONE ENCOUNTER
Pt left message requesting G7 sensor script to [address] [place] [address] in [place], [place] where she is attending college. Returned call and left message  that we will send script, call office with further concerns.

## 2023-08-23 ENCOUNTER — TELEPHONE (OUTPATIENT)
Dept: ENDOCRINOLOGY CLINIC | Facility: CLINIC | Age: 18
End: 2023-08-23

## 2023-08-23 RX ORDER — PEN NEEDLE, DIABETIC 32GX 5/32"
NEEDLE, DISPOSABLE MISCELLANEOUS
Qty: 450 EACH | Refills: 3 | Status: SHIPPED | OUTPATIENT
Start: 2023-08-23

## 2023-08-23 NOTE — TELEPHONE ENCOUNTER
Pt called in requesting pen needles to [address] [facility] in [place], where she is going to school. Injecting up  to 5 times daily. Sent to Stefan Perdue.  Pt reports she could not request via [employer] to Stefan Perdue, informed her we do have [facility] listed in Epic.

## 2023-08-26 ENCOUNTER — MOBILE ENCOUNTER (OUTPATIENT)
Dept: ENDOCRINOLOGY CLINIC | Facility: CLINIC | Age: 18
End: 2023-08-26

## 2023-08-26 NOTE — PROGRESS NOTES
Pt called on call provider at 2:30 pm regarding hypoglycemia  Pt reports she is on MDI - stopped pump ~ 1 m ago   Is  and has 2 hour practice most days per week ; starting at 4 pm   Having hypoglycemia during practice per dexcom   Feels eating 15gm carb w protein is \"barely increasing\" trends from hypoglycemia range   Takes up to 2 carb intake to increase trends   Does have Glucagon in case of severe hypoglycemia   Also admits to occasional ETOH     Dexcom review     8- through 8-   GMI 6.3%   Coefficient of variation: 19.2%   Average glucose : 125 mg/dl   96% TIR   0 % low   Reviewed past few days; highest risk of hypoglycemia after lunch - despite dosing only 1 u humalog for carbs   Eats/doses humalg ~ 12 pm   Practice starts at 4 pm   Lows start ~ 1 hr into practice       Conifirmed insulin nsulin doses   Lantus 10 units once daily   Humalog 1:38  ICR     Discussed impact that ETOH has on BG up to 24 hours   Reviewed impact activity has on BG trneds    Recommended insulin dose changes:   Decrease Lantus 10--> 8 units   Change ICR 38--> 50 especially meal preceding V ball practice     Also advised 15gm carb snack w protein at start of V ball practice.  May need another 15mg Carb/protein snack mid practice to avoid lows    Will forward message to [facility] SHAWN

## 2023-09-06 ENCOUNTER — PATIENT MESSAGE (OUTPATIENT)
Dept: ENDOCRINOLOGY CLINIC | Facility: CLINIC | Age: 18
End: 2023-09-06

## 2023-09-07 NOTE — TELEPHONE ENCOUNTER
Contacted patient to discuss concerns. Dexcom report reviewed, patient has been experiencing increase in postprandial elevations over last few days, however has been returning to baseline. Prior to, her glucose has been stable and in target range. Patient is currently traveling for a tournament for [employer]. Patient recently adjusted insulin doses and is taking Lantus 10 units injection daily and Humalog ICR 1u:45g and ISF 1u:58 mg/dl for target of 120 mg/dl. Patient will continue these doses at this time and will send condition update next week and CGM report will be reviewed for further evaluation. Patient verbalizes understanding and has no further questions.     Arianna BOWEN, BC-ADM, [facility]

## 2023-09-19 DIAGNOSIS — N92.6 IRREGULAR MENSES: ICD-10-CM

## 2023-09-19 DIAGNOSIS — N94.6 SEVERE MENSTRUAL CRAMPS: ICD-10-CM

## 2023-09-19 RX ORDER — ACETAMINOPHEN AND CODEINE PHOSPHATE 120; 12 MG/5ML; MG/5ML
0.35 SOLUTION ORAL DAILY
Qty: 90 TABLET | Refills: 0 | OUTPATIENT
Start: 2023-09-19 | End: 2024-09-18

## 2023-09-19 RX ORDER — ACYCLOVIR 400 MG/1
1 TABLET ORAL
Qty: 3 EACH | Refills: 11 | Status: SHIPPED | OUTPATIENT
Start: 2023-09-19

## 2023-09-19 NOTE — TELEPHONE ENCOUNTER
Requested Prescriptions     Pending Prescriptions Disp Refills    Continuous Blood Gluc Sensor (DEXCOM G7 SENSOR) Does not apply Misc 3 each 11     Si each Every 10 days. Your appointments       Date & Time Appointment Department [facility])    Dec 19, 2023  1:00 PM CST Diabetes Pump follow up with JESSI Suggs [facility], [facility] [address] [place] ([facility] [place])              [facility] [address] [place]  [facility] [place]  [address] [address]  [phone]          Last A1c value was 6.3% done 2023.     Refill 23  LOV 23 telemed

## 2023-10-07 DIAGNOSIS — N94.6 SEVERE MENSTRUAL CRAMPS: ICD-10-CM

## 2023-10-07 DIAGNOSIS — N92.6 IRREGULAR MENSES: ICD-10-CM

## 2023-10-11 RX ORDER — ACETAMINOPHEN AND CODEINE PHOSPHATE 120; 12 MG/5ML; MG/5ML
0.35 SOLUTION ORAL DAILY
Qty: 90 TABLET | Refills: 0 | Status: SHIPPED | OUTPATIENT
Start: 2023-10-11 | End: 2024-10-10

## 2023-10-13 ENCOUNTER — PATIENT MESSAGE (OUTPATIENT)
Dept: ENDOCRINOLOGY CLINIC | Facility: CLINIC | Age: 18
End: 2023-10-13

## 2023-10-17 RX ORDER — ACYCLOVIR 400 MG/1
1 TABLET ORAL
Qty: 3 EACH | Refills: 11 | Status: SHIPPED | OUTPATIENT
Start: 2023-10-17

## 2023-10-17 NOTE — TELEPHONE ENCOUNTER
Requested Prescriptions     Pending Prescriptions Disp Refills    Continuous Blood Gluc Sensor (DEXCOM G7 SENSOR) Does not apply Misc 3 each 11     Si each Every 10 days. Your appointments       Date & Time Appointment Department [facility])    Oct 20, 2023  8:00 AM CDT Video Visit with JESSI Ruiz [facility], [address] [place] [address], Rama Fuentes ([address])    Please verify your telehealth insurance benefits prior to your appointment. You must be in the state of [place] during the virtual visit. Please use the [employer] Lonnie and launch the video visit 10 minutes prior to your scheduled appointment time to ensure your camera and microphone are working properly. Once the video visit has started you will be placed in a waiting room until the provider begins the visit. You will receive an email confirmation with instructions. If you have questions, call your doctor's office directly. If you are having issues or need to use a desktop/laptop, please follow the below steps:        1. Close out all other open apps (could be competing for audio resources)  2. Disable Bluetooth  3.       Reboot mobile device before joining the video  4. Come off Wi-Fi and switch over to Data    Please see our Video Visit Tip Sheet if you need additional assistance. If you believe this is an emergency, please dial 911 immediately. Dec 19, 2023  1:00 PM CST Diabetes Pump follow up with JESSI Lindsay [facility], [facility] [address] ([facility] [place])              [facility], [address] [place] [address], [address] [place] [address]   [address] [place] [address] [address] [address]  [phone] [facility] [address]  [facility] [place]  [address] [address] [phone]          Last A1c value was 6.3% done 2023.     Refill 9/19/23  LOV 7/21/23

## 2023-11-07 RX ORDER — PEN NEEDLE, DIABETIC 32GX 5/32"
NEEDLE, DISPOSABLE MISCELLANEOUS
Qty: 450 EACH | Refills: 3 | Status: SHIPPED | OUTPATIENT
Start: 2023-11-07

## 2023-11-07 RX ORDER — ACYCLOVIR 400 MG/1
1 TABLET ORAL
Qty: 3 EACH | Refills: 11 | Status: SHIPPED | OUTPATIENT
Start: 2023-11-07

## 2023-11-07 NOTE — TELEPHONE ENCOUNTER
Requested Prescriptions     Pending Prescriptions Disp Refills    Insulin Pen Needle (TRUEPLUS 5-BEVEL PEN NEEDLES) 32G X 4 MM Does not apply Misc 450 each 3     Sig: Use up to 5 times daily to inject insulin    Continuous Blood Gluc Sensor (DEXCOM G7 SENSOR) Does not apply Misc 3 each 11     Si each Every 10 days. Your appointments       Date & Time Appointment Department [facility])    Dec 19, 2023  1:00 PM CST Diabetes Pump follow up with JESSI Zacarias [facility], [facility] [address] [place] ([facility] [place])              [facility], [facility] [address] [place]  [facility] [place]  [address] [address] [phone]          Last A1c value was 6.3% done 2023.     Refill 23, 10/17/23  LOV 23

## 2023-11-14 DIAGNOSIS — F41.9 ANXIETY: ICD-10-CM

## 2023-11-14 RX ORDER — ESCITALOPRAM OXALATE 10 MG/1
10 TABLET ORAL DAILY
Qty: 30 TABLET | Refills: 0 | Status: SHIPPED | OUTPATIENT
Start: 2023-11-14

## 2023-11-14 NOTE — TELEPHONE ENCOUNTER
Patient comment: I really felt a positive difference with taking this! Is there any chance to get the medication in a blister pack or would I need to contact the pharmacy about that? I use the [employer] to keep track of meds and that i take them but I sometimes forget to enter it and a blister pack like a birth control packet would actually be incredibly helpful!      Last time medication was refilled 10/20/2023  Quantity and # of refills 30 w 0  Last OV 10/20/2023  Next OV No appointment scheduled      Sent to JESSI Mixon for approval.

## 2023-11-25 ENCOUNTER — HOSPITAL ENCOUNTER (OUTPATIENT)
Dept: GENERAL RADIOLOGY | Age: 18
Discharge: HOME | End: 2023-11-25
Attending: PHYSICIAN ASSISTANT
Payer: COMMERCIAL

## 2023-11-25 DIAGNOSIS — M41.25 IDIOPATHIC SCOLIOSIS OF THORACOLUMBAR REGION: ICD-10-CM

## 2023-11-25 PROCEDURE — 72082 X-RAY EXAM ENTIRE SPI 2/3 VW: CPT | Performed by: PHYSICIAN ASSISTANT

## 2023-11-29 ENCOUNTER — PATIENT MESSAGE (OUTPATIENT)
Dept: FAMILY MEDICINE CLINIC | Facility: CLINIC | Age: 18
End: 2023-11-29

## 2023-11-29 DIAGNOSIS — M41.9 SCOLIOSIS, UNSPECIFIED SCOLIOSIS TYPE, UNSPECIFIED SPINAL REGION: Primary | ICD-10-CM

## 2023-11-30 NOTE — TELEPHONE ENCOUNTER
From: Cameron Burch  To: Deven Jenkins  Sent: 11/29/2023 2:06 PM CST  Subject: Referral to Dr Nathen Stewart office     Hello,   I just contacted Dr Nathen Stewart office to schedule an appointment and they stated that I need a referral to be able to make an appointment. Would you be able to send that over to them for me?    Thank you,   [employer]

## 2023-12-06 ENCOUNTER — PATIENT MESSAGE (OUTPATIENT)
Dept: ENDOCRINOLOGY CLINIC | Facility: CLINIC | Age: 18
End: 2023-12-06

## 2023-12-06 NOTE — TELEPHONE ENCOUNTER
From: Ez Sifuentes  To: Arianna Garcia  Sent: 2023 9:52 AM CST  Subject: Switching back to my insulin pump    Good Morning,   Sariah just gotten about a million emails from dexcom and tandem about the update finally being here for the [address] [facility] connecting to the tslim. Sariah been eagerly waiting to go back on my insulin pump since Sariah been out of season. Would you be able to send out my pump settings from the last dose change? Xiomy Yates been continuing with the 12u of lantus and been following a little closer to 20u for meals. And one last question, when going back on the pump, I would Melissa Copeland do it about 24 hours after my last dose of lantus right?    Thank you,   [employer]

## 2023-12-07 NOTE — TELEPHONE ENCOUNTER
Reviewed, patient to resume previous settings. Next appt with provider is scheduled for 12/19, further adjustments to be made as needed during that time.     Rony BOWEN, BC-ADM, [facility]

## 2023-12-18 ENCOUNTER — OFFICE VISIT (OUTPATIENT)
Dept: OBGYN CLINIC | Facility: CLINIC | Age: 18
End: 2023-12-18
Payer: COMMERCIAL

## 2023-12-18 VITALS
SYSTOLIC BLOOD PRESSURE: 133 MMHG | WEIGHT: 164.38 LBS | HEIGHT: 72 IN | BODY MASS INDEX: 22.26 KG/M2 | HEART RATE: 83 BPM | DIASTOLIC BLOOD PRESSURE: 83 MMHG

## 2023-12-18 DIAGNOSIS — Z30.09 ENCOUNTER FOR COUNSELING REGARDING CONTRACEPTION: ICD-10-CM

## 2023-12-18 DIAGNOSIS — Z11.3 ROUTINE SCREENING FOR STI (SEXUALLY TRANSMITTED INFECTION): ICD-10-CM

## 2023-12-18 DIAGNOSIS — Z01.419 ENCOUNTER FOR WELL WOMAN EXAM WITH ROUTINE GYNECOLOGICAL EXAM: Primary | ICD-10-CM

## 2023-12-18 DIAGNOSIS — F41.9 ANXIETY: ICD-10-CM

## 2023-12-18 PROCEDURE — 87491 CHLMYD TRACH DNA AMP PROBE: CPT

## 2023-12-18 PROCEDURE — 87591 N.GONORRHOEAE DNA AMP PROB: CPT

## 2023-12-18 RX ORDER — DROSPIRENONE 4 MG/1
1 TABLET, FILM COATED ORAL DAILY
Qty: 28 TABLET | Refills: 12 | Status: SHIPPED | OUTPATIENT
Start: 2023-12-18 | End: 2024-12-17

## 2023-12-18 RX ORDER — ESCITALOPRAM OXALATE 10 MG/1
10 TABLET ORAL DAILY
Qty: 30 TABLET | Refills: 0 | Status: SHIPPED | OUTPATIENT
Start: 2023-12-18

## 2023-12-18 NOTE — TELEPHONE ENCOUNTER
Last time medication was refilled 11/14/2023  Quantity and # of refills 30 w 0  Last OV 10/20/2023  Next OV No appointment scheduled      Sent to Dr. Mohsen Mi for approval

## 2023-12-18 NOTE — PROGRESS NOTES
Yong Amaya is a 19year old female No obstetric history on file. Patient's last menstrual period was 12/07/2023 (exact date). Chief Complaint   Patient presents with    Wellness Visit     New Patient- Annual Exam    Other     Irregular periods   . She had one dose of HPV, encouraged her to complete the other two doses of HPV. D/w patient the benefits of HPV vaccine. She will think about it. She has breakthrough bleeding on the POP. She could not tolerate the [place] ring in the past, caused her to be depressed. OBSTETRICS HISTORY:  OB History   No obstetric history on file. GYNE HISTORY:  Periods are irregular on the POP, she has breakthrough bleeding. History   Sexual Activity    Sexual activity: Yes    Partners: Male    Birth control/ protection: OCP        Hx Prior Abnormal Pap:  (Never had a pap done)        MEDICAL HISTORY:  Past Medical History:   Diagnosis Date    Anxiety     I was just started on lexapro recently    Diabetes mellitus ([facility] May 7, 23    Dysmenorrhea     Super crampy day 1 and 2 like bed bound    Type 1 diabetes mellitus ([facility]        SURGICAL HISTORY:  History reviewed. No pertinent surgical history.     SOCIAL HISTORY:  Social History     Socioeconomic History    Marital status: Single     Spouse name: Not on file    Number of children: Not on file    Years of education: Not on file    Highest education level: Not on file   Occupational History    Not on file   Tobacco Use    Smoking status: Never    Smokeless tobacco: Never   Vaping Use    Vaping Use: Never used   Substance and Sexual Activity    Alcohol use: Yes     Comment: occ    Drug use: No    Sexual activity: Yes     Partners: Male     Birth control/protection: OCP   Other Topics Concern     Service Not Asked    Blood Transfusions Not Asked    Caffeine Concern No    Occupational Exposure Not Asked    Hobby Hazards Not Asked    Sleep Concern Not Asked    Stress Concern No    Weight Concern No    Special Diet No    Back Care Not Asked    Exercise No    Bike Helmet Not Asked    Seat Belt No    Self-Exams Not Asked   Social History Narrative    Not on file     Social Determinants of Health     Financial Resource Strain: Not on file   Food Insecurity: Not on file   Transportation Needs: Not on file   Physical Activity: Not on file   Stress: Not on file   Social Connections: Not on file   Housing Stability: Not on file       FAMILY HISTORY:  Family History   Problem Relation Age of Onset    Diabetes Paternal Grandmother     Thyroid Disorder Maternal Grandmother        MEDICATIONS:    Current Outpatient Medications:     Drospirenone (SLYND) 4 MG Oral Tab, Take 1 tablet by mouth daily. , Disp: 28 tablet, Rfl: 12    escitalopram (LEXAPRO) 10 MG Oral Tab, Take 1 tablet (10 mg total) by mouth daily. , Disp: 30 tablet, Rfl: 0    Insulin Pen Needle (TRUEPLUS 5-BEVEL PEN NEEDLES) 32G X 4 MM Does not apply Misc, Use up to 5 times daily to inject insulin, Disp: 450 each, Rfl: 3    Continuous Blood Gluc Sensor (DEXCOM G7 SENSOR) Does not apply Misc, 1 each Every 10 days. , Disp: 3 each, Rfl: 11    insulin glargine (LANTUS SOLOSTAR) 100 UNIT/ML Subcutaneous Solution Pen-injector, Inject daily as directed up to TDD = 30 units  FOR INSULIN PUMP BACK UP ONLY, Disp: 45 mL, Rfl: 0    Insulin Lispro, 1 Unit Dial, (HUMALOG KWIKPEN) 100 UNIT/ML Subcutaneous Solution Pen-injector, Uses up to 50 units daily as directed based on carb intake and blood sugars at meals/snacks, Disp: 45 mL, Rfl: 0    Continuous Blood Gluc Transmit (DEXCOM G6 TRANSMITTER) Does not apply Misc, 1 each every 3 (three) months., Disp: 1 each, Rfl: 3    Microlet Lancets Does not apply Misc, , Disp: , Rfl:     CONTOUR NEXT TEST In Vitro Strip, , Disp: , Rfl:     Blood Glucose Monitoring Suppl (CONTOUR NEXT EZ) w/Device Does not apply Kit, , Disp: , Rfl:     glucagon (GVOKE HYPOPEN 2-PACK) 1 MG/0.2ML Subcutaneous SUBQ injection, Inject 0.2 mL (1 mg total) into the skin once as needed for Low blood glucose (as needed for treatment of severe hypoglycemia). , Disp: 0.4 mL, Rfl: 0    ALLERGIES:  No Known Allergies      Review of Systems:  Constitutional:  Denies fatigue, night sweats, hot flashes  Eyes:  denies blurred or double vision  Cardiovascular:  denies chest pain or palpitations  Respiratory:  denies shortness of breath  Gastrointestinal:  denies heartburn, abdominal pain, diarrhea or constipation  Genitourinary:  denies dysuria, incontinence, abnormal vaginal discharge, vaginal itching  Musculoskeletal:  denies back pain. Skin/Breast:  Denies any breast pain, lumps, or discharge. Neurological:  denies headaches, extremity weakness or numbness. Psychiatric: denies depression or anxiety. Endocrine:   denies excessive thirst or urination. Heme/Lymph:  denies history of anemia, easy bruising or bleeding. PHYSICAL EXAM:   Constitutional: well developed, well nourished  Head/Face: normocephalic  Neck/Thyroid: thyroid symmetric, no thyromegaly, no nodules, no adenopathy  Lymphatic:no abnormal supraclavicular or axillary adenopathy is noted  Breast: normal without palpable masses, tenderness, asymmetry, nipple discharge, nipple retraction or skin changes  Abdomen:  soft, nontender, nondistended, no masses  Skin/Hair: no unusual rashes or bruises  Extremities: no edema, no cyanosis  Psychiatric:  Oriented to time, place, person and situation.  Appropriate mood and affect    Pelvic Exam:  External Genitalia: normal appearance, hair distribution, and no lesions  Urethral Meatus:  normal in size, location, without lesions and prolapse  Bladder:  No fullness, masses or tenderness  Vagina:  Normal appearance without lesions, no abnormal discharge  Cervix:  Normal without tenderness on motion  Uterus: normal in size, contour, position, mobility, without tenderness  Adnexa: normal without masses or tenderness  Perineum: normal  Anus: no hemorroids     Assessment & Plan:  Diagnoses and all orders for this visit:    Encounter for well woman exam with routine gynecological exam    Routine screening for STI (sexually transmitted infection)  -     Chlamydia/Gc Amplification; Future    Encounter for counseling regarding contraception    Other orders  -     Drospirenone (SLYND) 4 MG Oral Tab; Take 1 tablet by mouth daily. D/w patient the different forms of contraception Mirena, POP, Implant and Depo - If she does not like Slynd, will call office to make an appointment for [address] IUD.

## 2023-12-19 ENCOUNTER — OFFICE VISIT (OUTPATIENT)
Dept: ENDOCRINOLOGY CLINIC | Facility: CLINIC | Age: 18
End: 2023-12-19
Payer: COMMERCIAL

## 2023-12-19 ENCOUNTER — OFFICE VISIT (OUTPATIENT)
Dept: SURGERY | Facility: CLINIC | Age: 18
End: 2023-12-19
Payer: COMMERCIAL

## 2023-12-19 VITALS
OXYGEN SATURATION: 97 % | HEART RATE: 73 BPM | WEIGHT: 166.31 LBS | BODY MASS INDEX: 23 KG/M2 | RESPIRATION RATE: 16 BRPM | SYSTOLIC BLOOD PRESSURE: 122 MMHG | DIASTOLIC BLOOD PRESSURE: 70 MMHG

## 2023-12-19 VITALS
BODY MASS INDEX: 22.35 KG/M2 | DIASTOLIC BLOOD PRESSURE: 78 MMHG | SYSTOLIC BLOOD PRESSURE: 118 MMHG | HEART RATE: 93 BPM | OXYGEN SATURATION: 100 % | WEIGHT: 165 LBS | HEIGHT: 72 IN

## 2023-12-19 DIAGNOSIS — G89.29 CHRONIC MIDLINE THORACIC BACK PAIN: Primary | ICD-10-CM

## 2023-12-19 DIAGNOSIS — M41.9 SCOLIOSIS OF THORACOLUMBAR SPINE, UNSPECIFIED SCOLIOSIS TYPE: ICD-10-CM

## 2023-12-19 DIAGNOSIS — Z96.41 INSULIN PUMP IN PLACE: ICD-10-CM

## 2023-12-19 DIAGNOSIS — E10.9 TYPE 1 DIABETES MELLITUS WITHOUT COMPLICATION (HCC): Primary | ICD-10-CM

## 2023-12-19 DIAGNOSIS — M54.6 CHRONIC MIDLINE THORACIC BACK PAIN: Primary | ICD-10-CM

## 2023-12-19 LAB
C TRACH DNA SPEC QL NAA+PROBE: NEGATIVE
CARTRIDGE LOT#: 634 NUMERIC
HEMOGLOBIN A1C: 6.1 % (ref 4.3–5.6)
N GONORRHOEA DNA SPEC QL NAA+PROBE: NEGATIVE

## 2023-12-19 PROCEDURE — 99214 OFFICE O/P EST MOD 30 MIN: CPT | Performed by: PHYSICIAN ASSISTANT

## 2023-12-19 PROCEDURE — 3074F SYST BP LT 130 MM HG: CPT | Performed by: NURSE PRACTITIONER

## 2023-12-19 PROCEDURE — 95251 CONT GLUC MNTR ANALYSIS I&R: CPT | Performed by: NURSE PRACTITIONER

## 2023-12-19 PROCEDURE — 3078F DIAST BP <80 MM HG: CPT | Performed by: PHYSICIAN ASSISTANT

## 2023-12-19 PROCEDURE — 3074F SYST BP LT 130 MM HG: CPT | Performed by: PHYSICIAN ASSISTANT

## 2023-12-19 PROCEDURE — 3078F DIAST BP <80 MM HG: CPT | Performed by: NURSE PRACTITIONER

## 2023-12-19 PROCEDURE — 99214 OFFICE O/P EST MOD 30 MIN: CPT | Performed by: NURSE PRACTITIONER

## 2023-12-19 PROCEDURE — 83036 HEMOGLOBIN GLYCOSYLATED A1C: CPT | Performed by: NURSE PRACTITIONER

## 2023-12-19 PROCEDURE — 3008F BODY MASS INDEX DOCD: CPT | Performed by: PHYSICIAN ASSISTANT

## 2023-12-19 NOTE — PROGRESS NOTES
New patient:  Reason for visit: lower back pain     Estimated time of onset:  month(s)    Numeric Rating Scale: (No Pain) 0  to  10 (Worst Pain)        Pain at Present:  1/10                                                                                                                       Distribution of Pain:    bilateral more on the left side         Prior diagnostic testing related to this condition:     XR 11.25.23

## 2023-12-21 ENCOUNTER — PATIENT MESSAGE (OUTPATIENT)
Dept: ENDOCRINOLOGY CLINIC | Facility: CLINIC | Age: 18
End: 2023-12-21

## 2023-12-21 NOTE — TELEPHONE ENCOUNTER
Received fax from [facility] with pt diabetic eye exam results. Email scanned to [place] to have [facility] confirm results.

## 2024-01-05 ENCOUNTER — OFFICE VISIT (OUTPATIENT)
Dept: FAMILY MEDICINE CLINIC | Facility: CLINIC | Age: 19
End: 2024-01-05
Payer: COMMERCIAL

## 2024-01-05 VITALS
RESPIRATION RATE: 18 BRPM | HEART RATE: 108 BPM | OXYGEN SATURATION: 98 % | TEMPERATURE: 98 F | SYSTOLIC BLOOD PRESSURE: 127 MMHG | DIASTOLIC BLOOD PRESSURE: 73 MMHG

## 2024-01-05 DIAGNOSIS — J01.10 ACUTE NON-RECURRENT FRONTAL SINUSITIS: Primary | ICD-10-CM

## 2024-01-05 PROCEDURE — 3074F SYST BP LT 130 MM HG: CPT | Performed by: NURSE PRACTITIONER

## 2024-01-05 PROCEDURE — 3078F DIAST BP <80 MM HG: CPT | Performed by: NURSE PRACTITIONER

## 2024-01-05 PROCEDURE — 99213 OFFICE O/P EST LOW 20 MIN: CPT | Performed by: NURSE PRACTITIONER

## 2024-01-05 RX ORDER — AMOXICILLIN AND CLAVULANATE POTASSIUM 875; 125 MG/1; MG/1
1 TABLET, FILM COATED ORAL 2 TIMES DAILY
Qty: 14 TABLET | Refills: 0 | Status: SHIPPED | OUTPATIENT
Start: 2024-01-05 | End: 2024-01-12

## 2024-01-05 NOTE — PROGRESS NOTES
CHIEF COMPLAINT:   sinus      HPI:   Summer Gamble is a 18 year old female who presents for sinus congestion for  2  weeks. Symptoms have been worsening since onset. Sinus congestion/pain is described as a pressure and is located mainly front of face.  Reports nasal discharge. Has treated symptoms with dayquil/nyquil.  Patient also reports headache, cough, fullness in ears.  Denies fever, chills, dental pain, tinnitus, N/V/D.        Current Outpatient Medications   Medication Sig Dispense Refill    amoxicillin clavulanate 875-125 MG Oral Tab Take 1 tablet by mouth 2 (two) times daily for 7 days. 14 tablet 0    escitalopram (LEXAPRO) 10 MG Oral Tab Take 1 tablet (10 mg total) by mouth daily. 30 tablet 0    Drospirenone (SLYND) 4 MG Oral Tab Take 1 tablet by mouth daily. 28 tablet 12    Insulin Pen Needle (TRUEPLUS 5-BEVEL PEN NEEDLES) 32G X 4 MM Does not apply Misc Use up to 5 times daily to inject insulin 450 each 3    Continuous Blood Gluc Sensor (DEXCOM G7 SENSOR) Does not apply Misc 1 each Every 10 days. 3 each 11    insulin glargine (LANTUS SOLOSTAR) 100 UNIT/ML Subcutaneous Solution Pen-injector Inject daily as directed up to TDD = 30 units  FOR INSULIN PUMP BACK UP ONLY 45 mL 0    Insulin Lispro, 1 Unit Dial, (HUMALOG KWIKPEN) 100 UNIT/ML Subcutaneous Solution Pen-injector Uses up to 50 units daily as directed based on carb intake and blood sugars at meals/snacks 45 mL 0    Continuous Blood Gluc Transmit (DEXCOM G6 TRANSMITTER) Does not apply Misc 1 each every 3 (three) months. 1 each 3    Microlet Lancets Does not apply Misc       CONTOUR NEXT TEST In Vitro Strip       Blood Glucose Monitoring Suppl (CONTOUR NEXT EZ) w/Device Does not apply Kit       glucagon (GVOKE HYPOPEN 2-PACK) 1 MG/0.2ML Subcutaneous SUBQ injection Inject 0.2 mL (1 mg total) into the skin once as needed for Low blood glucose (as needed for treatment of severe hypoglycemia). 0.4 mL 0      Past Medical History:   Diagnosis Date    Anxiety      I was just started on lexapro recently    Diabetes mellitus ([facility]) May 7, 23    Dysmenorrhea     Super crampy day 1 and 2 like bed bound    Type 1 diabetes mellitus ([facility])       No past surgical history on file.   Family History   Problem Relation Age of Onset    Diabetes Paternal Grandmother     Thyroid Disorder Maternal Grandmother       Social History     Socioeconomic History    Marital status: Single   Tobacco Use    Smoking status: Never    Smokeless tobacco: Never   Vaping Use    Vaping Use: Never used   Substance and Sexual Activity    Alcohol use: Yes     Comment: occ    Drug use: No    Sexual activity: Yes     Partners: Male     Birth control/protection: OCP   Other Topics Concern    Caffeine Concern No    Stress Concern No    Weight Concern No    Special Diet No    Exercise No    Seat Belt No         REVIEW OF SYSTEMS:   GENERAL: feels well otherwise, no unplanned weight change,  good appetite  SKIN: no rashes or abnormal skin lesions  HEENT: See HPI.    LUNGS: denies shortness of breath or wheezing, See HPI  CARDIOVASCULAR: denies chest pain or palpitations   GI: denies N/V/C or abdominal pain  NEURO: + sinus headaches.  No numbness or tingling in face.    EXAM:   /73   Pulse 108   Temp 98 °F (36.7 °C)   Resp 18   LMP 12/07/2023 (Exact Date)   SpO2 98%   GENERAL: well developed, well nourished,in no apparent distress  SKIN: no rashes,no suspicious lesions  HEAD: atraumatic, normocephalic,  +tenderness on palpation of frontal sinuses  EYES: conjunctiva clear, EOM intact  EARS: TM's clear gray, no bulging, no retraction, + fluid, bony landmarks intact  NOSE: nostrils patent, clear nasal mucous, nasal mucosa reddened and swollen  THROAT: oral mucosa pink, moist. No visible dental caries. Posterior pharynx is not erythematous. no exudates.  NECK: supple, non-tender  LUNGS: clear to auscultation bilaterally, no wheezes or rhonchi, no diminished breath sounds. Breathing is non labored.  CARDIO: RRR  without murmur  EXTREMITIES: no cyanosis, clubbing or edema  LYMPH:  bilateral anterior cervical lymphadenopathy.   NEURO: No focal deficits       ASSESSMENT AND PLAN:   ASSESSMENT:  Summer Gamble is a 18 year old female who presents with    ASSESSMENT:   Encounter Diagnosis   Name Primary?    Acute non-recurrent frontal sinusitis Yes     1. Acute non-recurrent frontal sinusitis  Continue otc medications  - amoxicillin clavulanate 875-125 MG Oral Tab; Take 1 tablet by mouth 2 (two) times daily for 7 days.  Dispense: 14 tablet; Refill: 0    PLAN: Meds and instructions as below.  Comfort care instructions as listed in Patient Instructions.  To f/u with PCP if no improvement in 3-5 days or sooner if sx worsen.    Meds & Refills for this Visit:  Requested Prescriptions     Signed Prescriptions Disp Refills    amoxicillin clavulanate 875-125 MG Oral Tab 14 tablet 0     Sig: Take 1 tablet by mouth 2 (two) times daily for 7 days.       Risks, benefits, side effects of medication addressed and explained.    See pt handout    The patient indicates understanding of these issues and agrees to the plan.

## 2024-01-09 DIAGNOSIS — E10.9 TYPE 1 DIABETES MELLITUS WITHOUT COMPLICATION (HCC): Primary | ICD-10-CM

## 2024-01-09 DIAGNOSIS — Z96.41 INSULIN PUMP IN PLACE: ICD-10-CM

## 2024-01-09 RX ORDER — INSULIN LISPRO 100 [IU]/ML
INJECTION, SOLUTION INTRAVENOUS; SUBCUTANEOUS
Qty: 50 ML | Refills: 2 | Status: SHIPPED | OUTPATIENT
Start: 2024-01-09 | End: 2024-01-09 | Stop reason: ALTCHOICE

## 2024-01-09 NOTE — TELEPHONE ENCOUNTER
Pt requested changing Humalog quickpen to vials, has pen needles with pump. Medication pended.     LOV: 12/19/23    Future Appointments   Date Time Provider Department Center   2/2/2024  2:30 PM Elena Hill APRN [id number]     Last A1c value was 6.1% done 12/19/2023.

## 2024-01-10 DIAGNOSIS — L70.9 ACNE, UNSPECIFIED ACNE TYPE: Primary | ICD-10-CM

## 2024-01-10 RX ORDER — DROSPIRENONE AND ETHINYL ESTRADIOL 0.02-3(28)
1 KIT ORAL DAILY
Qty: 28 TABLET | Refills: 11 | Status: SHIPPED | OUTPATIENT
Start: 2024-01-10 | End: 2024-01-11

## 2024-01-15 DIAGNOSIS — F41.9 ANXIETY: ICD-10-CM

## 2024-01-16 RX ORDER — ACYCLOVIR 400 MG/1
1 TABLET ORAL
Qty: 3 EACH | Refills: 11 | Status: SHIPPED | OUTPATIENT
Start: 2024-01-16

## 2024-01-16 RX ORDER — ESCITALOPRAM OXALATE 10 MG/1
10 TABLET ORAL DAILY
Qty: 30 TABLET | Refills: 0 | Status: SHIPPED | OUTPATIENT
Start: 2024-01-16

## 2024-01-16 NOTE — TELEPHONE ENCOUNTER
Requested Prescriptions     Pending Prescriptions Disp Refills    escitalopram (LEXAPRO) 10 MG Oral Tab 30 tablet 0     Sig: Take 1 tablet (10 mg total) by mouth daily.     Last refill 12/18/23 #30  LOV 10/20/23  No future appointments    Due for follow up appointment.  Reminder letter sent.

## 2024-01-16 NOTE — TELEPHONE ENCOUNTER
Requested Prescriptions     Pending Prescriptions Disp Refills    Continuous Blood Gluc Sensor (DEXCOM G7 SENSOR) Does not apply Misc 3 each 11     Si each Every 10 days.     Your appointments       Date & Time Appointment Department ([place])    2024  2:30 PM CST Video Visit with Elena Hill APRN [address] ([facility] [place])    Please verify your telehealth insurance benefits prior to your appointment.    You must be in the [facility] during the virtual visit.     Please use the [employer] Mobile Lonnie and launch the video visit 10 minutes prior to your scheduled appointment time to ensure your camera and microphone are working properly. Once the video visit has started you will be placed in a waiting room until the provider begins the visit.     You will receive an email confirmation with instructions.  If you have questions, call your doctor's office directly.    If you are having issues or need to use a desktop/laptop, please follow the below steps:        1.       Close out all other open apps (could be competing for audio resources)  2.       Disable Bluetooth  3.       Reboot mobile device before joining the video  4.       Come off Wi-Fi and switch over to Data    Please see our Video Visit Tip Sheet if you need additional assistance.     If you believe this is an emergency, please dial 911 immediately.                [address]  [facility] [place]  [address] [place] [address]  [facility] [address]  [phone]          Last A1c value was 6.1% done 2023.    Refill 23  LOV 23

## 2024-01-18 ENCOUNTER — TELEPHONE (OUTPATIENT)
Facility: CLINIC | Age: 19
End: 2024-01-18

## 2024-01-18 NOTE — TELEPHONE ENCOUNTER
Pt tried to order refill of DOROTHY from [facility] in [place], was told no refill until 01/31/2024. Pt took her last b/c pill today. Please advise.

## 2024-01-18 NOTE — TELEPHONE ENCOUNTER
Spoke to [facility], her RX Kell not due until 1/31/24, they can fill her RX with cost out of pocket for pt. Relayed to pt, she did fill her Rx in [place], can look into [facility] for discount. Patient verbalized understanding, agreed to and intend to comply with plan of care.

## 2024-01-23 ENCOUNTER — PATIENT MESSAGE (OUTPATIENT)
Dept: ENDOCRINOLOGY CLINIC | Facility: CLINIC | Age: 19
End: 2024-01-23

## 2024-01-23 DIAGNOSIS — E10.9 TYPE 1 DIABETES MELLITUS WITHOUT COMPLICATION (HCC): Primary | ICD-10-CM

## 2024-01-23 NOTE — TELEPHONE ENCOUNTER
Reviewed pump /sensor        TDD insulin: 30.59 units /day   Average daily basal: 11.67  units/day   Food bolus 2.63  u/day   Correction bolus  0.37 units /day   Control IQ bolus 0.97  units/day     OVERRIDE: 79% (14.94 units)     Dexcom download: 1.10.2024 thru 1.23.2024     Coefficient of variation: 27   GMI: estimated A1C 6.6     Average glucose: 139 mg/dl     87% In target range(70-180mg/dl)     11%High glucose targets (> 180mg/dl) :     1%Very high glucose targets:  (> 250mg/dl)     0%Low glucose targets:(less than 70mg/dl)     0%Very Low glucose targets: (< 54mg/dl ) :     Findings:   Hypoglycemia: some trends ; most are occurring after pump override bolus - see reports      Time in target: 87 %  (ADA recommended target > 70%)     Sent pt message to avoid entering more insulin than pump recommendation  No pump changes today

## 2024-01-23 NOTE — TELEPHONE ENCOUNTER
Dexcom and Tslim report to provider / covering provider.  Call with patient since the rates she provided differ slightly from the last visit note.  Pt states she did not change the settings on her own.  Pt concerned that she is spiking after meals (ex. 230's), and 30-45 minutes later she is dropping to 70-80 mg/dl.  This makes her feel terrible.  Dry mouth at highs, shaky at lows.    Adjustment needed?

## 2024-01-23 NOTE — TELEPHONE ENCOUNTER
From: Summer Gamble  To: Elena Hill  Sent: 1/23/2024 3:06 PM CST  Subject: Spiking after meals    Hello,   I have been following my sugars pretty closely lately and after every meal I get massive spikes. It usually comes down within the 2 hours after eating- but i’m concerned about this as I am waiting 15 minutes then eating, then about 30 minutes after eating my sugar spikes fast and then around 1:30 hour after eating it’ll fall fast. I’m not sure if it’s cause I’m eating too fast or if my basal rate needs to be increased. My basal is currently set at .55/hr, my correction is 1:55 and my carb ration is 1:18.

## 2024-01-29 ENCOUNTER — PATIENT MESSAGE (OUTPATIENT)
Dept: ENDOCRINOLOGY CLINIC | Facility: CLINIC | Age: 19
End: 2024-01-29

## 2024-02-02 ENCOUNTER — TELEMEDICINE (OUTPATIENT)
Dept: ENDOCRINOLOGY CLINIC | Facility: CLINIC | Age: 19
End: 2024-02-02
Payer: COMMERCIAL

## 2024-02-02 DIAGNOSIS — E10.9 TYPE 1 DIABETES MELLITUS WITHOUT COMPLICATION (HCC): Primary | ICD-10-CM

## 2024-02-02 DIAGNOSIS — Z96.41 INSULIN PUMP IN PLACE: ICD-10-CM

## 2024-02-02 DIAGNOSIS — E10.9 TYPE 1 DIABETES MELLITUS WITHOUT COMPLICATION (HCC): ICD-10-CM

## 2024-02-02 PROCEDURE — 99214 OFFICE O/P EST MOD 30 MIN: CPT | Performed by: NURSE PRACTITIONER

## 2024-02-02 PROCEDURE — 95251 CONT GLUC MNTR ANALYSIS I&R: CPT | Performed by: NURSE PRACTITIONER

## 2024-02-02 RX ORDER — INSULIN LISPRO 100 [IU]/ML
INJECTION, SOLUTION INTRAVENOUS; SUBCUTANEOUS
COMMUNITY
Start: 2024-02-02 | End: 2024-02-02

## 2024-02-02 RX ORDER — INSULIN LISPRO 100 [IU]/ML
INJECTION, SOLUTION INTRAVENOUS; SUBCUTANEOUS
Qty: 10 ML | Refills: 0 | Status: SHIPPED | OUTPATIENT
Start: 2024-02-02

## 2024-02-02 NOTE — PROGRESS NOTES
HPI:   Summer Gamble is a 18 year old female who presents virtually for the management of her diabetes.   This visit is conducted using Telemedicine with live, two way, interactive video and audio.  Patient verbally consents to Telemedicine visit.  Patient understands and accepts financial responsibility for any deductible, co-insurance and/or co-pays associated with this service.    Chief Complaint: Diabetes Follow Up    Patient is using continuous glucose monitoring or would be testing BGs at least 4 times per day.  Patient is on at least 3 insulin injections per day.   Patient is making self-adjustments in insulin according to blood sugars or carbs.    Diabetes: needs improvement  Type: 1   Duration:newly diagnosed, May 2023  Current Meds: Humalog via insulin pump, Lantus for insulin pump back up plan, Gvoke for tx of severe hypoglycemia  Complications: Hospitalizations for blood glucose, DKA     Tandem T-slim Insulin pump w/Control IQ  Control IQ Use: 95%    Basal rates:   12MN: 0.55 units/hour     Bolus settings:   Max bolus: 10 units     Carbohydrate ratio:   12MN: 1u:15g    Insulin sensitivity:  12MN: 1u:55 mg/dl    BG target:  12MN: 110 mg/dl    Active insulin: 5 hours     TDD insulin: 34.38 units   Basal: 12.99 units (38%)  Bolus: 21.38 units (62%)  Average daily carbs: 104g    Personal  Dexcom CGM  Analysis of data: 1/19/2024 - 2/1/2024  % Time CGM is Active: 100%  Sensor download: full report  in media  Average glucose : 149 mg/dl     Standard deviation: 46 mg/dl   CV (coefficient of variation) : 31%     4% time above 180mg /dl   15% time above 250 mg/dl  80% time in target range:  mg/dl   0% time below target range: 70mg/dl     Evaluation   1. Baseline glucose stable and mostly in target range  2. Some postprandial elevation, most of time returns to baseline  3. Some hypoglycemia when patient overrides bolus calculations  4. Normal glucose variability      Overall glucose control:   HGBA1C:    Lab  Results   Component Value Date    A1C 6.1 (A) 12/19/2023    A1C 6.3 (H) 07/27/2023    A1C 11.8 (A) 05/11/2023     (H) 07/27/2023          Wt Readings from Last 3 Encounters:   12/19/23 166 lb 4.8 oz (75.4 kg)   12/19/23 165 lb (74.8 kg)   12/18/23 164 lb 6.4 oz (74.6 kg)           Past History:   She  has a past medical history of Anxiety, Diabetes mellitus (HCC) (May 7, 23), Dysmenorrhea, and Type 1 diabetes mellitus (HCC).   Her family history includes Diabetes in her paternal grandmother; Thyroid Disorder in her maternal grandmother.   She  reports that she has never smoked. She has never used smokeless tobacco. She reports current alcohol use. She reports that she does not use drugs.     She has No Known Allergies.     Current Outpatient Medications on File Prior to Visit   Medication Sig    insulin lispro (HUMALOG) 100 UNIT/ML Injection Solution Inject via insulin pump as directed up to TDD = 50 units    escitalopram (LEXAPRO) 10 MG Oral Tab Take 1 tablet (10 mg total) by mouth daily.    Continuous Blood Gluc Sensor (DEXCOM G7 SENSOR) Does not apply Misc 1 each Every 10 days.    Drospirenone-Ethinyl Estradiol (DOROTHY) 3-0.02 MG Oral Tab Take 1 tablet by mouth daily.    Insulin Pen Needle (TRUEPLUS 5-BEVEL PEN NEEDLES) 32G X 4 MM Does not apply Misc Use up to 5 times daily to inject insulin    insulin glargine (LANTUS SOLOSTAR) 100 UNIT/ML Subcutaneous Solution Pen-injector Inject daily as directed up to TDD = 30 units  FOR INSULIN PUMP BACK UP ONLY    Microlet Lancets Does not apply Misc     CONTOUR NEXT TEST In Vitro Strip     Blood Glucose Monitoring Suppl (CONTOUR NEXT EZ) w/Device Does not apply Kit     glucagon (GVOKE HYPOPEN 2-PACK) 1 MG/0.2ML Subcutaneous SUBQ injection Inject 0.2 mL (1 mg total) into the skin once as needed for Low blood glucose (as needed for treatment of severe hypoglycemia).     No current facility-administered medications on file prior to visit.       CMP  (most recent labs)    Lab Results   Component Value Date/Time     (H) 07/27/2023 09:50 AM    BUN 13 07/27/2023 09:50 AM    CREATSERUM 0.84 07/27/2023 09:50 AM    EGFRCR 103 07/27/2023 09:50 AM    CA 9.2 07/27/2023 09:50 AM    ALKPHO 52 07/27/2023 09:50 AM    AST 19 07/27/2023 09:50 AM    ALT 17 07/27/2023 09:50 AM    BILT 1.3 07/27/2023 09:50 AM    TP 7.4 07/27/2023 09:50 AM    ALB 3.9 07/27/2023 09:50 AM     07/27/2023 09:50 AM    K 4.0 07/27/2023 09:50 AM     07/27/2023 09:50 AM    CO2 26.0 07/27/2023 09:50 AM           Lipids  (most recent labs)   Lab Results   Component Value Date/Time    CHOLEST 222 (H) 07/27/2023 09:50 AM    TRIG 65 07/27/2023 09:50 AM    HDL 64 (H) 07/27/2023 09:50 AM     (H) 07/27/2023 09:50 AM    NONHDLC 158 (H) 07/27/2023 09:50 AM          Diabetes  (most recent labs)   Lab Results   Component Value Date/Time    A1C 6.1 (A) 12/19/2023 01:18 PM          Microalb (most recent labs)   Lab Results   Component Value Date/Time    MICROALBCREA 21.2 05/11/2023 09:31 AM        Lab results reviewed with patient.    REVIEW OF SYSTEMS:   GENERAL HEALTH: feels well otherwise  SKIN: denies any unusual skin lesions or rashes  RESPIRATORY: denies shortness of breath with exertion  CARDIOVASCULAR: denies chest pain on exertion  GI: denies abdominal pain and denies heartburn  NEURO: denies headaches     EXAM:   Physical Exam  Pulmonary:      Comments: Able to speak in complete sentences without difficulty  Neurological:      Mental Status: She is alert and oriented to person, place, and time.   Psychiatric:         Mood and Affect: Mood normal.         Behavior: Behavior normal.         ASSESSMENT AND PLAN:   Diabetes: Patient to continue Humalog via insulin pump, Lantus for insulin pump back up plan and Gvoke for treatment of severe hypoglycemia.  Adjustments to insulin pump settings:  Tandem T-slim Insulin pump w/Control IQ  Basal rates:   12MN: 0.6 units/hour (increased from 0.55 units/hour)    0700: 0.55 units/hour (added)  1900: 0.6 units/hour (added)    Bolus settings:   Max bolus: 10 units     Carbohydrate ratio:   12MN: 1u:15g    Insulin sensitivity:  12MN: 1u:55 mg/dl    BG target:  12MN: 110 mg/dl    Active insulin: 5 hours     Insulin pump back up plan: Give Lantus 15 units injection daily and continue Humalog ICR 1u:15g and ISF 1u:55 mg/dl for target of 110 mg/dl.  For mechanical issues contact Tandem.    Reviewed onset, peak and duration of Humalog.  Reinforced avoiding overriding bolus calculation on automated pump to avoid hypoglycemia episodes and rebound hyperglycemia.  Patient to continue to use personal Dexcom CGM for glucose monitoring.  Discussed self monitoring blood glucose and the importance of monitoring.  Patient agreed to monitoring qid - before meals and 2 hours postprandial of one meal per day if not using CGM.  Hypoglycemia S&S, Rx, and when to call APRN/CDE reviewed using Rule of 15's. Stressed need to call if 2 readings below 80 mg/dl in 1 week for medication adjustment.   Reinforced healthy eating in healthy portions and increasing daily physical activity.  Patient will send CEINT message in 2 weeks with condition update.    DM Health Maintenance  Last dilated eye exam: Last Dilated Eye Exam: 12/21/23   Exam shows retinopathy? Eye Exam shows Diabetic Retinopathy?: No    Last diabetic foot exam: Last Foot Exam: 07/28/23    Date of last PHQ-2 depression screen: No data recorded  Patient  reports that she has never smoked. She has never used smokeless tobacco.  When is flu vaccine due? Influenza Vaccine(1) Never done  When is pneumonia vaccine due? No recommendations at this time    The patient indicates understanding of these issues and agrees to the plan.  Refills addressed at time of office visit.    Diagnoses and all orders for this visit:    Type 1 diabetes mellitus without complication (HCC)  -     Comp Metabolic Panel (14) [E]; Future  -     Lipid Panel [E]; Future  -      Hemoglobin A1C [E]; Future  -     Microalb/Creat Ratio, Random Urine [E]; Future    Insulin pump in place         Return in about 3 months (around 5/9/2024) for 45 minute appointment, Personal CGM, Insulin pump.    The risks and benefits of my recommendations, as well as other treatment options were discussed with the patient today. questions were answered to the best of my knowledge.  Patient verbalizes understanding and amendable to plan of care.  Duration of this service:  22 minutes   Elena BOWEN, BC-ADM, [facility]

## 2024-02-02 NOTE — TELEPHONE ENCOUNTER
Last time medication was refilled 02/04/2024  Quantity and # of refills 10 ml  Last OV 10/20/2023  Next OV No appointment scheduled

## 2024-02-05 DIAGNOSIS — E10.9 TYPE 1 DIABETES MELLITUS WITHOUT COMPLICATION (HCC): ICD-10-CM

## 2024-02-05 DIAGNOSIS — Z96.41 INSULIN PUMP IN PLACE: ICD-10-CM

## 2024-02-05 RX ORDER — INSULIN LISPRO 100 [IU]/ML
INJECTION, SOLUTION INTRAVENOUS; SUBCUTANEOUS
Qty: 10 ML | Refills: 0 | OUTPATIENT
Start: 2024-02-05

## 2024-02-05 NOTE — TELEPHONE ENCOUNTER
Left patient a vcm asking which pharmacy they want medication sent to     My chart message also sent     Will wait for response

## 2024-02-12 DIAGNOSIS — F41.9 ANXIETY: ICD-10-CM

## 2024-02-12 RX ORDER — ESCITALOPRAM OXALATE 10 MG/1
10 TABLET ORAL DAILY
Qty: 30 TABLET | Refills: 0 | Status: SHIPPED | OUTPATIENT
Start: 2024-02-12

## 2024-02-28 ENCOUNTER — PATIENT MESSAGE (OUTPATIENT)
Dept: ENDOCRINOLOGY CLINIC | Facility: CLINIC | Age: 19
End: 2024-02-28

## 2024-02-28 DIAGNOSIS — Z96.41 INSULIN PUMP IN PLACE: ICD-10-CM

## 2024-02-28 DIAGNOSIS — E10.9 TYPE 1 DIABETES MELLITUS WITHOUT COMPLICATION (HCC): ICD-10-CM

## 2024-02-29 RX ORDER — ACYCLOVIR 400 MG/1
1 TABLET ORAL
Qty: 3 EACH | Refills: 11 | Status: SHIPPED | OUTPATIENT
Start: 2024-02-29

## 2024-02-29 RX ORDER — INSULIN LISPRO 100 [IU]/ML
INJECTION, SOLUTION INTRAVENOUS; SUBCUTANEOUS
Qty: 50 ML | Refills: 0 | Status: SHIPPED | OUTPATIENT
Start: 2024-02-29

## 2024-02-29 NOTE — TELEPHONE ENCOUNTER
From: Summer Gamble  To: Elena Hill  Sent: 2/28/2024 5:27 PM CST  Subject: Insulin Prescription     Hello,  I was wondering if it were possible for my prescription for insulin to include 2 vials instead of 1. The one vial maybe lasts 4-5 site changes and having to refill that often is very time consuming for me.   I know with my spring season for volleyball starting that I’ll be using less insulin but it also means I’ll be travelling more and having extra insulin while I travel in case of emergency- gives ease of mind.   Thank you!

## 2024-02-29 NOTE — TELEPHONE ENCOUNTER
Requested Prescriptions     Pending Prescriptions Disp Refills    Continuous Blood Gluc Sensor (DEXCOM G7 SENSOR) Does not apply Misc 3 each 11     Si each Every 10 days.     Your appointments       Date & Time Appointment Department ([place])    May 09, 2024 11:15 AM CDT Diabetes Pump follow up with Elena Hill APRN [facility] [address], [place] ([facility] [place])              [facility] [address], [facility] [place]  [address] [facility] [id number] [phone]          Last A1c value was 6.1% done 2023.      Refill 24  LOV 24

## 2024-03-13 DIAGNOSIS — E10.9 TYPE 1 DIABETES MELLITUS WITHOUT COMPLICATION (HCC): ICD-10-CM

## 2024-03-13 DIAGNOSIS — Z96.41 INSULIN PUMP IN PLACE: ICD-10-CM

## 2024-03-13 DIAGNOSIS — L70.9 ACNE, UNSPECIFIED ACNE TYPE: ICD-10-CM

## 2024-03-13 DIAGNOSIS — F41.9 ANXIETY: ICD-10-CM

## 2024-03-13 RX ORDER — ESCITALOPRAM OXALATE 10 MG/1
10 TABLET ORAL DAILY
Qty: 30 TABLET | Refills: 0 | Status: SHIPPED | OUTPATIENT
Start: 2024-03-13

## 2024-03-13 RX ORDER — DROSPIRENONE AND ETHINYL ESTRADIOL 0.02-3(28)
1 KIT ORAL DAILY
Qty: 84 TABLET | Refills: 2 | Status: SHIPPED | OUTPATIENT
Start: 2024-03-13

## 2024-03-13 RX ORDER — INSULIN LISPRO 100 [IU]/ML
INJECTION, SOLUTION INTRAVENOUS; SUBCUTANEOUS
Qty: 50 ML | Refills: 0 | Status: SHIPPED | OUTPATIENT
Start: 2024-03-13

## 2024-03-13 NOTE — TELEPHONE ENCOUNTER
Last time medication was refilled 02/12/2024  Quantity and # of refills 30 w/ 0  Last OV 10/20/2023  Next OV No Future Appointments    Medication not on protocol.

## 2024-03-13 NOTE — TELEPHONE ENCOUNTER
Requested Prescriptions     Pending Prescriptions Disp Refills    insulin lispro (HUMALOG) 100 UNIT/ML Injection Solution 50 mL 0     Sig: Inject via insulin pump as directed up to TDD = 50 units     Future Appointments   Date Time Provider Department Center   5/9/2024 11:15 AM Elena Hill APRN [id number]     Last A1c value was 6.1% done 12/19/2023.  Refill 2/29/24  LOV 2/2/24

## 2024-03-14 ENCOUNTER — TELEPHONE (OUTPATIENT)
Dept: ENDOCRINOLOGY CLINIC | Facility: CLINIC | Age: 19
End: 2024-03-14

## 2024-03-14 NOTE — TELEPHONE ENCOUNTER
Received PA from [place] regarding lispro (which is preferred over Humalog brand with her insurance).  No PA needed.  Pt can fill tomorrow per pharmacy.

## 2024-03-26 RX ORDER — ACYCLOVIR 400 MG/1
1 TABLET ORAL
Qty: 3 EACH | Refills: 11 | Status: SHIPPED | OUTPATIENT
Start: 2024-03-26

## 2024-03-26 NOTE — TELEPHONE ENCOUNTER
Requested Prescriptions     Pending Prescriptions Disp Refills    Continuous Blood Gluc Sensor (DEXCOM G7 SENSOR) Does not apply Misc 3 each 11     Si each Every 10 days.     Your appointments       Date & Time Appointment Department ([place])    May 09, 2024 11:15 AM CDT Diabetes Pump follow up with Elena Hill APRN [facility] [address], [place] ([facility] [place])              [facility] [address], [facility] [place]  [address] [facility] [id number] [phone]          Last A1c value was 6.1% done 2023.    Refill 24  LOV 24

## 2024-04-08 DIAGNOSIS — F41.9 ANXIETY: ICD-10-CM

## 2024-04-08 RX ORDER — ESCITALOPRAM OXALATE 10 MG/1
10 TABLET ORAL DAILY
Qty: 30 TABLET | Refills: 0 | Status: SHIPPED | OUTPATIENT
Start: 2024-04-08 | End: 2024-04-11

## 2024-04-11 DIAGNOSIS — E10.9 TYPE 1 DIABETES MELLITUS WITHOUT COMPLICATION (HCC): ICD-10-CM

## 2024-04-11 DIAGNOSIS — Z96.41 INSULIN PUMP IN PLACE: ICD-10-CM

## 2024-04-12 RX ORDER — INSULIN LISPRO 100 [IU]/ML
INJECTION, SOLUTION INTRAVENOUS; SUBCUTANEOUS
Qty: 50 ML | Refills: 0 | Status: SHIPPED | OUTPATIENT
Start: 2024-04-12

## 2024-04-12 NOTE — TELEPHONE ENCOUNTER
Requested Prescriptions     Pending Prescriptions Disp Refills    insulin lispro (HUMALOG) 100 UNIT/ML Injection Solution 50 mL 0     Sig: Inject via insulin pump as directed up to TDD = 50 units     Your appointments       Date & Time Appointment Department ([place])    May 09, 2024 11:15 AM CDT Diabetes Pump follow up with Elena Hill APRN [facility] [address], [place] ([facility] [place])              [facility] [address], [facility] [place]  [address] [facility] [address] [phone]          Last A1c value was 6.1% done 12/19/2023.    Refill 3/13/24  LOV 2/2/24

## 2024-04-29 DIAGNOSIS — E10.9 TYPE 1 DIABETES MELLITUS WITHOUT COMPLICATION (HCC): ICD-10-CM

## 2024-04-29 DIAGNOSIS — Z96.41 INSULIN PUMP IN PLACE: ICD-10-CM

## 2024-04-30 RX ORDER — INSULIN LISPRO 100 [IU]/ML
INJECTION, SOLUTION INTRAVENOUS; SUBCUTANEOUS
Qty: 50 ML | Refills: 0 | Status: SHIPPED | OUTPATIENT
Start: 2024-04-30 | End: 2024-05-04

## 2024-04-30 NOTE — TELEPHONE ENCOUNTER
Requested Prescriptions     Pending Prescriptions Disp Refills    insulin lispro (HUMALOG) 100 UNIT/ML Injection Solution 50 mL 0     Sig: Inject via insulin pump as directed up to TDD = 50 units     Your appointments       Date & Time Appointment Department ([place])    May 09, 2024 11:15 AM CDT Diabetes Pump follow up with Elena Hill APRN [facility] [address], [place] ([facility] [place])              [facility] [address] [facility] [place]  [address] [facility] [address] [phone]          Last A1c value was 6.1% done 12/19/2023.    Refill 4/12/24  LOV 2/2/24

## 2024-05-04 DIAGNOSIS — F41.9 ANXIETY: ICD-10-CM

## 2024-05-04 DIAGNOSIS — E10.9 TYPE 1 DIABETES MELLITUS WITHOUT COMPLICATION (HCC): ICD-10-CM

## 2024-05-04 DIAGNOSIS — Z96.41 INSULIN PUMP IN PLACE: ICD-10-CM

## 2024-05-05 RX ORDER — ESCITALOPRAM OXALATE 10 MG/1
15 TABLET ORAL DAILY
Qty: 45 TABLET | Refills: 3 | Status: SHIPPED | OUTPATIENT
Start: 2024-05-05

## 2024-05-06 RX ORDER — INSULIN LISPRO 100 [IU]/ML
INJECTION, SOLUTION INTRAVENOUS; SUBCUTANEOUS
Qty: 50 ML | Refills: 0 | Status: SHIPPED | OUTPATIENT
Start: 2024-05-06

## 2024-05-06 NOTE — TELEPHONE ENCOUNTER
Requested Prescriptions     Pending Prescriptions Disp Refills    insulin lispro (HUMALOG) 100 UNIT/ML Injection Solution 50 mL 0     Sig: Inject via insulin pump as directed up to TDD = 50 units     Your appointments       Date & Time Appointment Department ([place])    May 09, 2024 11:15 AM CDT Diabetes Pump follow up with Elena Hill APRN [facility] [address], [place] ([facility] [place])              [facility] [address] [facility] [place]  [address] [facility] [id number] [phone]          Last A1c value was 6.1% done 12/19/2023.    Refill 4/30/24  LOV 2/2/24

## 2024-05-07 ENCOUNTER — LAB ENCOUNTER (OUTPATIENT)
Dept: LAB | Age: 19
End: 2024-05-07
Attending: NURSE PRACTITIONER
Payer: COMMERCIAL

## 2024-05-07 DIAGNOSIS — E10.9 TYPE 1 DIABETES MELLITUS WITHOUT COMPLICATION (HCC): ICD-10-CM

## 2024-05-07 LAB
ALBUMIN SERPL-MCNC: 3.9 G/DL (ref 3.4–5)
ALBUMIN/GLOB SERPL: 1 {RATIO} (ref 1–2)
ALP LIVER SERPL-CCNC: 40 U/L
ALT SERPL-CCNC: 16 U/L
ANION GAP SERPL CALC-SCNC: 6 MMOL/L (ref 0–18)
AST SERPL-CCNC: 16 U/L (ref 15–37)
BILIRUB SERPL-MCNC: 0.7 MG/DL (ref 0.1–2)
BUN BLD-MCNC: 19 MG/DL (ref 9–23)
CALCIUM BLD-MCNC: 9.4 MG/DL (ref 8.5–10.1)
CHLORIDE SERPL-SCNC: 105 MMOL/L (ref 98–112)
CHOLEST SERPL-MCNC: 235 MG/DL (ref ?–200)
CO2 SERPL-SCNC: 27 MMOL/L (ref 21–32)
CREAT BLD-MCNC: 0.92 MG/DL
CREAT UR-SCNC: 311 MG/DL
EGFRCR SERPLBLD CKD-EPI 2021: 91 ML/MIN/1.73M2 (ref 60–?)
EST. AVERAGE GLUCOSE BLD GHB EST-MCNC: 137 MG/DL (ref 68–126)
FASTING PATIENT LIPID ANSWER: YES
FASTING STATUS PATIENT QL REPORTED: YES
GLOBULIN PLAS-MCNC: 4.1 G/DL (ref 2.8–4.4)
GLUCOSE BLD-MCNC: 128 MG/DL (ref 70–99)
HBA1C MFR BLD: 6.4 % (ref ?–5.7)
HDLC SERPL-MCNC: 83 MG/DL (ref 40–59)
LDLC SERPL CALC-MCNC: 140 MG/DL (ref ?–100)
MICROALBUMIN UR-MCNC: 1.22 MG/DL
MICROALBUMIN/CREAT 24H UR-RTO: 3.9 UG/MG (ref ?–30)
NONHDLC SERPL-MCNC: 152 MG/DL (ref ?–130)
OSMOLALITY SERPL CALC.SUM OF ELEC: 290 MOSM/KG (ref 275–295)
POTASSIUM SERPL-SCNC: 4.1 MMOL/L (ref 3.5–5.1)
PROT SERPL-MCNC: 8 G/DL (ref 6.4–8.2)
SODIUM SERPL-SCNC: 138 MMOL/L (ref 136–145)
TRIGL SERPL-MCNC: 70 MG/DL (ref 30–149)
VLDLC SERPL CALC-MCNC: 13 MG/DL (ref 0–30)

## 2024-05-07 PROCEDURE — 83036 HEMOGLOBIN GLYCOSYLATED A1C: CPT

## 2024-05-07 PROCEDURE — 82043 UR ALBUMIN QUANTITATIVE: CPT

## 2024-05-07 PROCEDURE — 82570 ASSAY OF URINE CREATININE: CPT

## 2024-05-07 PROCEDURE — 36415 COLL VENOUS BLD VENIPUNCTURE: CPT

## 2024-05-07 PROCEDURE — 80061 LIPID PANEL: CPT

## 2024-05-07 PROCEDURE — 80053 COMPREHEN METABOLIC PANEL: CPT

## 2024-05-09 ENCOUNTER — OFFICE VISIT (OUTPATIENT)
Dept: ENDOCRINOLOGY CLINIC | Facility: CLINIC | Age: 19
End: 2024-05-09
Payer: COMMERCIAL

## 2024-05-09 VITALS
RESPIRATION RATE: 20 BRPM | SYSTOLIC BLOOD PRESSURE: 110 MMHG | OXYGEN SATURATION: 98 % | DIASTOLIC BLOOD PRESSURE: 76 MMHG | BODY MASS INDEX: 23 KG/M2 | WEIGHT: 167 LBS | HEART RATE: 82 BPM

## 2024-05-09 DIAGNOSIS — Z96.41 INSULIN PUMP IN PLACE: ICD-10-CM

## 2024-05-09 DIAGNOSIS — E10.9 TYPE 1 DIABETES MELLITUS WITHOUT COMPLICATION (HCC): Primary | ICD-10-CM

## 2024-05-09 PROCEDURE — 99214 OFFICE O/P EST MOD 30 MIN: CPT | Performed by: NURSE PRACTITIONER

## 2024-05-09 PROCEDURE — 95251 CONT GLUC MNTR ANALYSIS I&R: CPT | Performed by: NURSE PRACTITIONER

## 2024-05-09 RX ORDER — GLUCAGON INJECTION, SOLUTION 1 MG/.2ML
1 INJECTION, SOLUTION SUBCUTANEOUS ONCE AS NEEDED
Qty: 0.4 ML | Refills: 0 | Status: SHIPPED | OUTPATIENT
Start: 2024-05-09 | End: 2024-05-09

## 2024-05-09 NOTE — PROGRESS NOTES
HPI:   Summer Gamble is a 18 year old female who presents for follow up for the management of her diabetes.     Chief Complaint   Patient presents with    Diabetes     F/U - tslim       Diabetes: stable, at goal  Type: 1   Duration: dx May 2023  Current Meds: Humalog via insulin pump, Lantus for insulin pump back up plan, Gvoke for tx of severe hypoglycemia  Complications: Hospitalizations for blood glucose, DKA       Tandem T-slim Insulin Pump w/Control IQ  Control IQ Use: 90%    Basal rates:   12MN: 0.6 units/hour   0700: 0.55 units/hour  1900: 0.6 units/hour    Bolus settings:   Max bolus: 10 units     Carbohydrate ratio:   12MN: 1u:15g    Insulin sensitivity:  12MN: 1u:55 mg/dl    BG target:  12MN: 110    Active insulin: 5 hours     TDD insulin: 30.50 units   Basal: 14.22 units (47%)  Bolus: 16.27 units (53%)  Control IQ Boluses: 41%  Average daily carbs: 174g    Personal  Dexcom CGM  Analysis of data: 4/26/2024 - 5/9/2024  Time CGM in Use: 98%  Sensor download: full report  in media  Average glucose : 151 mg/dl     Standard deviation: 45 mg/dl   CV (coefficient of variation) : 30%     22% time above 180mg /dl   2.8% time above 250 mg/dl  75% time in target range:  mg/dl   0.1% time below target range: 70mg/dl     Evaluation   1. Baseline glucose stable and in target range  2. Some evening postprandial elevation  3. Increased likelihood of hypoglycemia evening - premeal  4. Normal glucose variability           Overall glucose control:   HGBA1C:    Lab Results   Component Value Date    A1C 6.4 (H) 05/07/2024    A1C 6.1 (A) 12/19/2023    A1C 6.3 (H) 07/27/2023     (H) 05/07/2024          Wt Readings from Last 3 Encounters:   05/09/24 167 lb (75.8 kg)   12/19/23 166 lb 4.8 oz (75.4 kg)   12/19/23 165 lb (74.8 kg)     BP Readings from Last 3 Encounters:   05/09/24 110/76   01/05/24 127/73   12/19/23 122/70          Past History:   She  has a past medical history of Anxiety, Diabetes mellitus (HCC)  (May 7, 23), Dysmenorrhea, and Type 1 diabetes mellitus (HCC).   Her family history includes Diabetes in her paternal grandmother; Thyroid Disorder in her maternal grandmother.   She  reports that she has never smoked. She has never used smokeless tobacco. She reports current alcohol use. She reports that she does not use drugs.     She has No Known Allergies.     Current Outpatient Medications on File Prior to Visit   Medication Sig    insulin lispro (HUMALOG) 100 UNIT/ML Injection Solution Inject via insulin pump as directed up to TDD = 50 units    escitalopram 10 MG Oral Tab Take 1.5 tablets (15 mg total) by mouth daily.    Continuous Blood Gluc Sensor (DEXCOM G7 SENSOR) Does not apply Misc 1 each Every 10 days.    Drospirenone-Ethinyl Estradiol (DOROTHY) 3-0.02 MG Oral Tab Take 1 tablet by mouth daily.    Insulin Pen Needle (TRUEPLUS 5-BEVEL PEN NEEDLES) 32G X 4 MM Does not apply Misc Use up to 5 times daily to inject insulin    insulin glargine (LANTUS SOLOSTAR) 100 UNIT/ML Subcutaneous Solution Pen-injector Inject daily as directed up to TDD = 30 units  FOR INSULIN PUMP BACK UP ONLY    Microlet Lancets Does not apply Misc     CONTOUR NEXT TEST In Vitro Strip     Blood Glucose Monitoring Suppl (CONTOUR NEXT EZ) w/Device Does not apply Kit     [DISCONTINUED] glucagon (GVOKE HYPOPEN 2-PACK) 1 MG/0.2ML Subcutaneous SUBQ injection Inject 0.2 mL (1 mg total) into the skin once as needed for Low blood glucose (as needed for treatment of severe hypoglycemia).     No current facility-administered medications on file prior to visit.       CMP  (most recent labs)   Lab Results   Component Value Date/Time     (H) 05/07/2024 10:13 AM    BUN 19 05/07/2024 10:13 AM    CREATSERUM 0.92 05/07/2024 10:13 AM    EGFRCR 91 05/07/2024 10:13 AM    CA 9.4 05/07/2024 10:13 AM    ALKPHO 40 (L) 05/07/2024 10:13 AM    AST 16 05/07/2024 10:13 AM    ALT 16 05/07/2024 10:13 AM    BILT 0.7 05/07/2024 10:13 AM    TP 8.0 05/07/2024 10:13 AM     ALB 3.9 05/07/2024 10:13 AM     05/07/2024 10:13 AM    K 4.1 05/07/2024 10:13 AM     05/07/2024 10:13 AM    CO2 27.0 05/07/2024 10:13 AM           Lipids  (most recent labs)   Lab Results   Component Value Date/Time    CHOLEST 235 (H) 05/07/2024 10:13 AM    TRIG 70 05/07/2024 10:13 AM    HDL 83 (H) 05/07/2024 10:13 AM     (H) 05/07/2024 10:13 AM    NONHDLC 152 (H) 05/07/2024 10:13 AM          Diabetes  (most recent labs)   Lab Results   Component Value Date/Time    A1C 6.4 (H) 05/07/2024 10:13 AM          Microalb (most recent labs)   Lab Results   Component Value Date/Time    MICROALBCREA 3.9 05/07/2024 10:13 AM        Lab results reviewed with patient.    REVIEW OF SYSTEMS:   GENERAL HEALTH: feels well otherwise  SKIN: denies any unusual skin lesions or rashes  RESPIRATORY: denies shortness of breath with exertion  CARDIOVASCULAR: denies chest pain on exertion  GI: denies nausea, abdominal pain or heartburn  NEURO: denies headaches and denies numbness and tingling to extremities  ENDO: denies polydipsia, polyuria or polyphagia, denies unexplained weight changes    EXAM:   /76   Pulse 82   Resp 20   Wt 167 lb (75.8 kg)   LMP 12/07/2023 (Exact Date)   SpO2 98%   BMI 22.65 kg/m²  Estimated body mass index is 22.65 kg/m² as calculated from the following:    Height as of 12/19/23: 6' (1.829 m).    Weight as of this encounter: 167 lb (75.8 kg).   Physical Exam  Vitals reviewed.   Constitutional:       Appearance: Normal appearance.   Cardiovascular:      Pulses:           Dorsalis pedis pulses are 2+ on the right side and 2+ on the left side.        Posterior tibial pulses are 2+ on the right side and 2+ on the left side.   Pulmonary:      Effort: Pulmonary effort is normal.   Feet:      Right foot:      Protective Sensation: 5 sites tested.  5 sites sensed.      Skin integrity: Skin integrity normal.      Toenail Condition: Right toenails are normal.      Left foot:      Protective  Sensation: 5 sites tested.  5 sites sensed.      Skin integrity: Skin integrity normal.      Toenail Condition: Left toenails are normal.      Comments: Diabetes foot exam performed: Vibration to dorsum to the first toe perceived bilaterally.  Foot care practices reviewed with patient.    Neurological:      Mental Status: She is alert and oriented to person, place, and time.   Psychiatric:         Mood and Affect: Mood normal.         Behavior: Behavior normal.         ASSESSMENT AND PLAN:   As for her Diabetes, it is well controlled, stable.   Recommendations are: continue present meds and check feet daily.    Patient to continue Humalog via insulin pump, Lantus for insulin pump back up plan, and Gvoke for tx of severe hypoglycemia    Adjustments to insulin pump settings:    Tandem T-slim Insulin Pump w/Control IQ  Basal rates:   12MN: 0.6 units/hour   0700: 0.55 units/hour  1700 (time changed from 1900): 0.55 units/hour (decreased from 0.6 units/hour)    Bolus settings:   Max bolus: 10 units     Carbohydrate ratio:   12MN: 1u:15g  1700: 1u:12g (increased from 1u:15g)    Insulin sensitivity:  12MN: 1u:55 mg/dl    BG target:  12MN: 110    Active insulin: 5 hours     Insulin pump back up plan: Give Lantus 14 units injection daily and continue Humalog ICR 1u:15g and ISF 1u:55 mg/dl for target of 110 mg/dl.  For mechanical issues contact [employer]andem.    Patient to continue to use personal Dexcom CGM for glucose monitoring.  Discussed self monitoring blood glucose and the importance of monitoring.  Patient agreed to monitoring qid - before meals and 2 hours postprandial of one meal per day if not using CGM.    Hypoglycemia S&S, Rx, and when to call APRN/CDE reviewed using Rule of 15's. Stressed need to call if 2 readings below 80 mg/dl in 1 week for medication adjustment.  Use Gvoke for treatment of severe hypoglycemia.  New prescription sent to pharmacy.    Reinforced healthy eating in healthy portions and increasing daily  physical activity.      DM Health Maintenance  Last dilated eye exam: Last Dilated Eye Exam: 23   Exam shows retinopathy? Eye Exam shows Diabetic Retinopathy?: No    Last diabetic foot exam: Last Foot Exam: 24    Date of last PHQ-2 depression screen: PHQ-2 - Date of last depression screenin2024    Patient  reports that she has never smoked. She has never used smokeless tobacco.  When is flu vaccine due? No recommendations at this time  When is pneumonia vaccine due? No recommendations at this time    The patient indicates understanding of these issues and agrees to the plan.  Refills addressed at time of office visit.    Diagnoses and all orders for this visit:    Type 1 diabetes mellitus without complication (HCC)  -     glucagon (GVOKE HYPOPEN 2-PACK) 1 MG/0.2ML Subcutaneous injection; Inject 0.2 mL (1 mg total) into the skin once as needed for Low blood glucose (as needed for treatment of severe hypoglycemia).  -     Hemoglobin A1C [E]; Future    Insulin pump in place  -     glucagon (GVOKE HYPOPEN 2-PACK) 1 MG/0.2ML Subcutaneous injection; Inject 0.2 mL (1 mg total) into the skin once as needed for Low blood glucose (as needed for treatment of severe hypoglycemia).         Return in about 3 months (around 2024) for 45 minute appointment, Personal CGM, Insulin pump, Virtual Visit.    The risks and benefits of my recommendations, as well as other treatment options were discussed with the patient today. Questions were answered to the best of my knowledge.   40 min spent with patient and >50% time spent counseling and coordinating care related to their office visit.      Elena BOWEN, BC-ADM, [facility]   details…

## 2024-05-15 ENCOUNTER — PATIENT MESSAGE (OUTPATIENT)
Dept: ENDOCRINOLOGY CLINIC | Facility: CLINIC | Age: 19
End: 2024-05-15

## 2024-05-15 NOTE — TELEPHONE ENCOUNTER
From: Summer Gamble  To: Elena Hill  Sent: 5/15/2024 11:09 AM CDT  Subject: Blood sugar after meals     Hello,  Recently, after every meal my blood sugar skyrockets up into the 200-300 range and gets stubborn to come back down. This is happening even with super careful carb counting and waiting 15-20 minutes before eating. It’s super frustrating and I get a headache after every meal with this fast rise.

## 2024-05-15 NOTE — TELEPHONE ENCOUNTER
Intubation    Date/Time: 7/5/2023 10:58 AM  Performed by: Nikki Goncalves CRNA  Authorized by: Colt Bailon MD     Intubation:     Induction:  Intravenous    Intubated:  Postinduction    Mask Ventilation:  Moderately difficult with oral airway (2 hand mask ventilation)    Attempts:  1    Attempted By:  Student    Method of Intubation:  Video laryngoscopy    Blade:  Wang 4    Laryngeal View Grade: Grade I - full view of cords      Difficult Airway Encountered?: No      Complications:  None    Airway Device:  Oral endotracheal tube    Airway Device Size:  7.5    Style/Cuff Inflation:  Cuffed (inflated to minimal occlusive pressure)    Tube secured:  22    Secured at:  The lips    Placement Verified By:  Capnometry    Complicating Factors:  None    Findings Post-Intubation:  BS equal bilateral  Notes:      Intubation by Aguilar Membreno SRNA     Patient had similar concerns in January, patient messages on 01/23/2024 and 01/29/2024.  Tslim data only through 05/09/2024, dexcom report pulled also and forwarded to provider.  Per dexcom report, glucose was high for over last 12 hours.  Called patient to ask when she last changed her pump site, it was yesterday morning.  Asked if it was possible site was compromised.  Patient states it normally hurts when that happens, she did check to make sure it wasn't leaking, etc.  Patient does not feel it's a site issue, but we did discuss if she remains high today it might be a good idea to change her site again.

## 2024-05-16 NOTE — TELEPHONE ENCOUNTER
Contacted patient to discuss concerns.  Recommended the following insulin pump adjustments:    Tandem T-slim Insulin Pump w/Control IQ  Basal rates:   12MN: 0.6 units/hour   0700: 0.55 units/hour  1700  0.6 units/hour (increase from 0.55 units/hour)     Bolus settings:   Max bolus: 10 units      Carbohydrate ratio:   12MN: 1u:15g  1700: 1u:12g      Insulin sensitivity:  12MN: 1u:50 mg/dl (increased from 1u:55 mg/dl)     BG target:  12MN: 110     Active insulin: 5 hours    *Patient's routine has been changing since out of schedule but will be resuming workouts and summer school in the next few weeks.  Patient will send condition update on changes made next week.  Advised patient to contact T[employer] if T-connect rod continues to have issues.  May need to delete and redownload rod.  Patient verbalizes understanding and has no further questions at this time.    Elena BOWEN, BC-ADM, [facility]

## 2024-05-20 DIAGNOSIS — L70.9 ACNE, UNSPECIFIED ACNE TYPE: ICD-10-CM

## 2024-05-20 DIAGNOSIS — E10.9 TYPE 1 DIABETES MELLITUS WITHOUT COMPLICATION (HCC): ICD-10-CM

## 2024-05-20 DIAGNOSIS — Z96.41 INSULIN PUMP IN PLACE: ICD-10-CM

## 2024-05-20 RX ORDER — INSULIN LISPRO 100 [IU]/ML
INJECTION, SOLUTION INTRAVENOUS; SUBCUTANEOUS
Qty: 50 ML | Refills: 0 | Status: SHIPPED | OUTPATIENT
Start: 2024-05-20

## 2024-05-20 RX ORDER — DROSPIRENONE AND ETHINYL ESTRADIOL 0.02-3(28)
1 KIT ORAL DAILY
Qty: 84 TABLET | Refills: 2 | OUTPATIENT
Start: 2024-05-20

## 2024-05-20 RX ORDER — ACYCLOVIR 400 MG/1
1 TABLET ORAL
Qty: 3 EACH | Refills: 11 | Status: SHIPPED | OUTPATIENT
Start: 2024-05-20

## 2024-05-20 NOTE — TELEPHONE ENCOUNTER
Requested Prescriptions     Pending Prescriptions Disp Refills    Continuous Glucose Sensor (DEXCOM G7 SENSOR) Does not apply Misc 3 each 11     Si each Every 10 days.    insulin lispro (HUMALOG) 100 UNIT/ML Injection Solution 50 mL 0     Sig: Inject via insulin pump as directed up to TDD = 50 units     Your appointments       Date & Time Appointment Department ([place])    Aug 09, 2024 3:00 PM CDT Video Visit with Elena Hill APRN [address] ([facility] [place])    Please verify your telehealth insurance benefits prior to your appointment.    You must be in the [facility] during the virtual visit.     Please use the [employer] and launch the video visit 10 minutes prior to your scheduled appointment time to ensure your camera and microphone are working properly. Once the video visit has started you will be placed in a waiting room until the provider begins the visit.     You will receive an email confirmation with instructions.  If you have questions, call your doctor's office directly.    If you are having issues or need to use a desktop/laptop, please follow the below steps:        1.       Close out all other open apps (could be competing for audio resources)  2.       Disable Bluetooth  3.       Reboot mobile device before joining the video  4.       Come off Wi-Fi and switch over to Data    Please see our Video Visit Tip Sheet if you need additional assistance.     If you believe this is an emergency, please dial 911 immediately.                [address]  [facility] [place]  [address] [address] [facility] [address] [facility] [id number] [phone]          Last A1c value was 6.4% done 2024.    Refill 3/26/24, 24  LOV 24

## 2024-05-28 DIAGNOSIS — L70.9 ACNE, UNSPECIFIED ACNE TYPE: ICD-10-CM

## 2024-05-28 RX ORDER — DROSPIRENONE AND ETHINYL ESTRADIOL 0.02-3(28)
1 KIT ORAL DAILY
Qty: 84 TABLET | Refills: 2 | OUTPATIENT
Start: 2024-05-28

## 2024-06-02 DIAGNOSIS — Z96.41 INSULIN PUMP IN PLACE: ICD-10-CM

## 2024-06-02 DIAGNOSIS — E10.9 TYPE 1 DIABETES MELLITUS WITHOUT COMPLICATION (HCC): ICD-10-CM

## 2024-06-02 DIAGNOSIS — F41.9 ANXIETY: ICD-10-CM

## 2024-06-03 RX ORDER — INSULIN LISPRO 100 [IU]/ML
INJECTION, SOLUTION INTRAVENOUS; SUBCUTANEOUS
Qty: 50 ML | Refills: 0 | Status: SHIPPED | OUTPATIENT
Start: 2024-06-03

## 2024-06-03 RX ORDER — ESCITALOPRAM OXALATE 10 MG/1
15 TABLET ORAL DAILY
Qty: 45 TABLET | Refills: 3 | Status: SHIPPED | OUTPATIENT
Start: 2024-06-03

## 2024-06-03 NOTE — TELEPHONE ENCOUNTER
Requested Prescriptions     Pending Prescriptions Disp Refills    insulin lispro (HUMALOG) 100 UNIT/ML Injection Solution 50 mL 0     Sig: Inject via insulin pump as directed up to TDD = 50 units     Future Appointments   Date Time Provider Department Center   8/9/2024  3:00 PM Elena Hill APRN [facility]     Last A1c value was 6.4% done 5/7/2024.  Refill 5/20/24  LOV 5/9/24

## 2024-06-08 DIAGNOSIS — E10.9 TYPE 1 DIABETES MELLITUS WITHOUT COMPLICATION (HCC): ICD-10-CM

## 2024-06-08 DIAGNOSIS — Z96.41 INSULIN PUMP IN PLACE: ICD-10-CM

## 2024-06-10 RX ORDER — INSULIN LISPRO 100 [IU]/ML
INJECTION, SOLUTION INTRAVENOUS; SUBCUTANEOUS
Qty: 50 ML | Refills: 0 | Status: SHIPPED | OUTPATIENT
Start: 2024-06-10

## 2024-06-10 RX ORDER — ACYCLOVIR 400 MG/1
1 TABLET ORAL
Qty: 3 EACH | Refills: 11 | Status: SHIPPED | OUTPATIENT
Start: 2024-06-10

## 2024-06-10 NOTE — TELEPHONE ENCOUNTER
Requested Prescriptions     Pending Prescriptions Disp Refills    Continuous Glucose Sensor (DEXCOM G7 SENSOR) Does not apply Misc 3 each 11     Si each Every 10 days.    insulin lispro (HUMALOG) 100 UNIT/ML Injection Solution 50 mL 0     Sig: Inject via insulin pump as directed up to TDD = 50 units     Future Appointments   Date Time Provider Department Center   2024  3:00 PM Elena Hill APRN [facility]   Last A1c value was 6.4% done 2024.  Refill   6/3/24 humalog   24 sensor   LOV 24

## 2024-06-23 ENCOUNTER — PATIENT MESSAGE (OUTPATIENT)
Dept: ENDOCRINOLOGY CLINIC | Facility: CLINIC | Age: 19
End: 2024-06-23

## 2024-06-23 DIAGNOSIS — E10.9 TYPE 1 DIABETES MELLITUS WITHOUT COMPLICATION (HCC): ICD-10-CM

## 2024-06-24 RX ORDER — INSULIN GLARGINE 100 [IU]/ML
INJECTION, SOLUTION SUBCUTANEOUS
Qty: 15 ML | Refills: 0 | Status: SHIPPED | OUTPATIENT
Start: 2024-06-24

## 2024-06-24 RX ORDER — INSULIN LISPRO 100 [IU]/ML
INJECTION, SOLUTION INTRAVENOUS; SUBCUTANEOUS
Qty: 15 ML | Refills: 0 | Status: SHIPPED | OUTPATIENT
Start: 2024-06-24

## 2024-06-24 RX ORDER — PEN NEEDLE, DIABETIC 32GX 5/32"
1 NEEDLE, DISPOSABLE MISCELLANEOUS 4 TIMES DAILY
Qty: 400 EACH | Refills: 0 | Status: SHIPPED | OUTPATIENT
Start: 2024-06-24 | End: 2025-06-24

## 2024-06-24 NOTE — TELEPHONE ENCOUNTER
Contacted patient and discussed insulin pump back up plan, changes to dosing as sent via [employer] message and sent prescription for pens and pen needles to pharmacy.  Patient verbalizes understanding to send condition update next week.  Elena BOWEN, BC-ADM, [facility]

## 2024-06-24 NOTE — TELEPHONE ENCOUNTER
Med was initially ordered as, \"TAKE 1 TABLET BY MOUTH AT BEDTIME FOR 2 WEEKS. INCREASE TO 2 TABLET\".     This is 10mg once daily at bedtime.     I called the pharmacy and confirmed instructions as written above, two 5mg tabs at bedtime. They are requesting new rx for 10mg at bedtime for insurance purposes.     I called patients daughter Roxanne to confirm the patient is taking 10mg at bedtime and to let her know I am sending a new rx for 10mg tablets to the pharmacy.     JENIFER as phone continued to ring.    Pt called in right not would like to come off the pump states her and her patients are going back and forward on difference insurance and they felt it would be best for patient to return to MDI instead informed pt I would notify DH pended flex touch Lantus and Humalog please review. Changed pharmacy to [facility]

## 2024-06-24 NOTE — TELEPHONE ENCOUNTER
From: Summer Gamble  To: Elena Sergio  Sent: 6/23/2024 10:59 AM CDT  Subject: Changes to my insulin     Good Morning,   I hope you had a good weekend.   Over the last week or so I have noticed a gradual increase in my fasting blood sugar. Over night I’ve been hanging around 150 and I wake up not feeling great. I know I’m still 73% in range but I personally find I feel best when I’m in range closer to 85-90%. I also have been feeling super run down and been experiencing some other issues mainly with my skin. I’m going to urgent care this week to get that seen and hopefully taken care of. I’m not sure why my insulin needs have been constantly increasing, but it’s getting really tiring after every meal to be in the 200-250s and it being very stubborn to come back down.

## 2024-07-08 ENCOUNTER — TELEPHONE (OUTPATIENT)
Dept: ENDOCRINOLOGY CLINIC | Facility: CLINIC | Age: 19
End: 2024-07-08

## 2024-07-08 NOTE — TELEPHONE ENCOUNTER
Returned call, patient reports she  recently went off pump and is now MDI.  Taking 16 units of lantus once daily, ICR 1:12 for breakfast / lunch ICR 1:10 with dinner.  ISF of 1:55, patient was not sure at which number to start correcting.  States her BG has been really high after meals and she can't get it down, has been in 200 and 300's.  Dry mouth, increased thirst, tired.  No other mediation or dietary changes.  Report to provider.    Hyperglycemia triage:   Confirm current Diabetes medications with patient (not from chart note)   Onset, frequency and duration of symptoms?   Recent illness or steroid prescription/injection?   Obtain Blood sugar readings: BG log or CGM?  Changes in diet? Changes in any medications?       -----------------------------  Dexcom Clarity  -----------------------------  Summer Gamble  YOB: 2005  Generated at:  10:06 AM CDT  Reporting period:  -   -----------------------------  Glucose Details  Average glucose: 171 mg/dL  Standard deviation: 57 mg/dL  GMI: 7.4%  -----------------------------  Time in Range  Very High: 8%  High: 28%  In Range: 63%  Low: <1%  Very Low: <1%    Target Range   mg/dL    -----------------------------  CGM Details  Sensor usage: 93%  Days with CGM data:   -----------------------------  Insulin  Average total: 39.3 u/day  Average daily fast-acting doses: 4.0 doses/day    Percent long-actin%  Average daily long-acting dose: 16.0 u/day  Percent fast-actin%  Average daily fast-acting dose: 23.3 u/day  -----------------------------

## 2024-07-08 NOTE — TELEPHONE ENCOUNTER
Spoke with patient regarding concerns with glucose.  Instructed patient to increase Lantus to 20 units injection daily.  Change Humalog ICR 1u:10g with all meals and snacks and increase ISF 1u:50 mg/dl for target of 110mg/dl.  If patient continues to experience evening postprandial elevation by end of week increase ICR at dinner 1u:8g.  Patient verbalizes understanding and will send condition update via [email or web address] end of week.    Elena BOWEN, BC-ADM, [facility]

## 2024-08-05 ENCOUNTER — TELEPHONE (OUTPATIENT)
Dept: ENDOCRINOLOGY CLINIC | Facility: CLINIC | Age: 19
End: 2024-08-05

## 2024-08-05 DIAGNOSIS — E10.9 TYPE 1 DIABETES MELLITUS WITHOUT COMPLICATION (HCC): Primary | ICD-10-CM

## 2024-08-05 NOTE — TELEPHONE ENCOUNTER
Pt off pump, streaming dexcom    Pended new A1c order as the other A1c order is specific to [facility] I believe, will fax to requested location once signed

## 2024-08-06 LAB — AMB EXT HGBA1C: 6.7 %

## 2024-08-07 DIAGNOSIS — L70.9 ACNE, UNSPECIFIED ACNE TYPE: ICD-10-CM

## 2024-08-07 DIAGNOSIS — Z96.41 INSULIN PUMP IN PLACE: ICD-10-CM

## 2024-08-07 DIAGNOSIS — E10.9 TYPE 1 DIABETES MELLITUS WITHOUT COMPLICATION (HCC): ICD-10-CM

## 2024-08-07 DIAGNOSIS — F41.9 ANXIETY: ICD-10-CM

## 2024-08-07 RX ORDER — ESCITALOPRAM OXALATE 10 MG/1
15 TABLET ORAL DAILY
Qty: 45 TABLET | Refills: 2 | Status: SHIPPED | OUTPATIENT
Start: 2024-08-07

## 2024-08-07 RX ORDER — DROSPIRENONE AND ETHINYL ESTRADIOL 0.02-3(28)
1 KIT ORAL DAILY
Qty: 84 TABLET | Refills: 0 | Status: SHIPPED | OUTPATIENT
Start: 2024-08-07 | End: 2024-10-04

## 2024-08-07 NOTE — TELEPHONE ENCOUNTER
Requested Prescriptions     Pending Prescriptions Disp Refills    Insulin Pen Needle (TRUEPLUS 5-BEVEL PEN NEEDLES) 32G X 4 MM Does not apply Misc 450 each 3     Sig: Use up to 5 times daily to inject insulin    glucagon (GVOKE HYPOPEN 2-PACK) 1 MG/0.2ML Subcutaneous injection 0.4 mL 0     Sig: Inject 0.2 mL (1 mg total) into the skin once as needed for Low blood glucose (as needed for treatment of severe hypoglycemia).    Continuous Glucose Sensor (DEXCOM G7 SENSOR) Does not apply Misc 3 each 11     Si each Every 10 days.    insulin glargine (LANTUS SOLOSTAR) 100 UNIT/ML Subcutaneous Solution Pen-injector 15 mL 0     Sig: Inject daily as directed up to TDD = 30 units  FOR INSULIN PUMP BACK UP ONLY    Insulin Lispro, 1 Unit Dial, (HUMALOG KWIKPEN) 100 UNIT/ML Subcutaneous Solution Pen-injector 15 mL 0     Sig: Inject 3x/day with meals up to TDD = 30 units     Future Appointments   Date Time Provider Department Center   2024  3:00 PM Elena Hill APRN [facility]   2024  2:00 PM Berenice Monteiro APRN [facility]     Last A1c value was 6.4% done 2024.  Refill   24 D.Kofiole   24 D.Sergio   23 D.hoole   LOV 24 D.Sergio

## 2024-08-07 NOTE — TELEPHONE ENCOUNTER
Last time medication was refilled 06/03/2024  Last office visit  04/11/2024  Next office visit due/scheduled   Future Appointments   Date Time Provider Department Center   8/9/2024  3:00 PM Elena Hill APRN [facility]   12/26/2024  2:00 PM Berenice Monteiro APRN [facility]     Medication not on protocol.

## 2024-08-08 ENCOUNTER — TELEPHONE (OUTPATIENT)
Dept: ENDOCRINOLOGY CLINIC | Facility: CLINIC | Age: 19
End: 2024-08-08

## 2024-08-08 RX ORDER — GLUCAGON INJECTION, SOLUTION 1 MG/.2ML
1 INJECTION, SOLUTION SUBCUTANEOUS ONCE AS NEEDED
Qty: 0.4 ML | Refills: 0 | Status: SHIPPED | OUTPATIENT
Start: 2024-08-08 | End: 2024-08-08

## 2024-08-08 RX ORDER — INSULIN GLARGINE 100 [IU]/ML
INJECTION, SOLUTION SUBCUTANEOUS
Qty: 15 ML | Refills: 0 | Status: SHIPPED | OUTPATIENT
Start: 2024-08-08

## 2024-08-08 RX ORDER — ACYCLOVIR 400 MG/1
1 TABLET ORAL
Qty: 3 EACH | Refills: 11 | Status: SHIPPED | OUTPATIENT
Start: 2024-08-08

## 2024-08-08 RX ORDER — PEN NEEDLE, DIABETIC 32GX 5/32"
NEEDLE, DISPOSABLE MISCELLANEOUS
Qty: 450 EACH | Refills: 3 | Status: SHIPPED | OUTPATIENT
Start: 2024-08-08

## 2024-08-08 RX ORDER — INSULIN LISPRO 100 [IU]/ML
INJECTION, SOLUTION INTRAVENOUS; SUBCUTANEOUS
Qty: 15 ML | Refills: 0 | Status: SHIPPED | OUTPATIENT
Start: 2024-08-08

## 2024-08-09 ENCOUNTER — TELEMEDICINE (OUTPATIENT)
Dept: ENDOCRINOLOGY CLINIC | Facility: CLINIC | Age: 19
End: 2024-08-09
Payer: COMMERCIAL

## 2024-08-09 DIAGNOSIS — E10.9 TYPE 1 DIABETES MELLITUS WITHOUT COMPLICATION (HCC): Primary | ICD-10-CM

## 2024-08-09 PROCEDURE — 99214 OFFICE O/P EST MOD 30 MIN: CPT | Performed by: NURSE PRACTITIONER

## 2024-08-09 PROCEDURE — 95251 CONT GLUC MNTR ANALYSIS I&R: CPT | Performed by: NURSE PRACTITIONER

## 2024-08-09 NOTE — PROGRESS NOTES
HPI:   Summer Gamble is a 18 year old female who presents virtually for the management of her diabetes.   This visit is conducted using Telemedicine with live, two way, interactive video and audio.  Patient verbally consents to Telemedicine visit.  Patient understands and accepts financial responsibility for any deductible, co-insurance and/or co-pays associated with this service.    Chief Complaint: Diabetes Follow Up    Diabetes: stable, at goal  Type: 1   Duration:dxx May 2023  Current Meds: Humalog ICR 1u:10g and ISF 1u:50 mg/dl for target of 110 mg/dl, Lantus 20 units injection daily     Long term insulin use: yes  Complications: Hospitalization for blood glucose, DKA w/dx    Personal Dexcom CGM  Analysis of data: 7/26/2024 - 8/8/2024  % Time CGM is Active: 99.9%  Sensor download: full report  in media  Average glucose : 160 mg/dl   GMI: 7.1%    Standard deviation: 51 mg/dl   CV (coefficient of variation) : 31.7%     27% time above 180mg /dl   5% time above 250 mg/dl  66% time in target range:  mg/dl   2% time below target range: 70mg/dl     Evaluation   1. Pattern of overnight glucose elevation   2. Afternoon and evening glucose stable and in target range  3. Occasional afternoon hypoglycemia  4. Normal glucose variability         Overall glucose control:   HGBA1C:    Lab Results   Component Value Date    A1C 6.7 08/06/2024    A1C 6.4 (H) 05/07/2024    A1C 6.1 (A) 12/19/2023     (H) 05/07/2024          Wt Readings from Last 3 Encounters:   05/09/24 167 lb (75.8 kg)   12/19/23 166 lb 4.8 oz (75.4 kg)   12/19/23 165 lb (74.8 kg)           Past History:   She  has a past medical history of Anxiety, Diabetes mellitus (HCC) (May 7, 23), Dysmenorrhea, and Type 1 diabetes mellitus (HCC).   Her family history includes Diabetes in her paternal grandmother; Thyroid Disorder in her maternal grandmother.   She  reports that she has never smoked. She has never used smokeless tobacco. She reports current  alcohol use. She reports that she does not use drugs.     She has No Known Allergies.     Current Outpatient Medications on File Prior to Visit   Medication Sig    Insulin Pen Needle (TRUEPLUS 5-BEVEL PEN NEEDLES) 32G X 4 MM Does not apply Misc Use up to 5 times daily to inject insulin    glucagon (GVOKE HYPOPEN 2-PACK) 1 MG/0.2ML Subcutaneous injection Inject 0.2 mL (1 mg total) into the skin once as needed for Low blood glucose (as needed for treatment of severe hypoglycemia).    Continuous Glucose Sensor (DEXCOM G7 SENSOR) Does not apply Misc 1 each Every 10 days.    insulin glargine (LANTUS SOLOSTAR) 100 UNIT/ML Subcutaneous Solution Pen-injector Inject daily as directed up to TDD = 30 units  FOR INSULIN PUMP BACK UP ONLY    Insulin Lispro, 1 Unit Dial, (HUMALOG KWIKPEN) 100 UNIT/ML Subcutaneous Solution Pen-injector Inject 3x/day with meals up to TDD = 30 units    escitalopram 10 MG Oral Tab Take 1.5 tablets (15 mg total) by mouth daily.    Drospirenone-Ethinyl Estradiol (DOROTHY) 3-0.02 MG Oral Tab Take 1 tablet by mouth daily.    [DISCONTINUED] insulin glargine (LANTUS SOLOSTAR) 100 UNIT/ML Subcutaneous Solution Pen-injector Inject daily as directed up to TDD = 30 units  FOR INSULIN PUMP BACK UP ONLY    [DISCONTINUED] Insulin Lispro, 1 Unit Dial, (HUMALOG KWIKPEN) 100 UNIT/ML Subcutaneous Solution Pen-injector Inject 3x/day with meals up to TDD = 30 units    Insulin Pen Needle (BD PEN NEEDLE SANDRITA U/F) 32G X 4 MM Does not apply Misc Inject 1 Pen into the skin 4 (four) times daily.    [DISCONTINUED] Continuous Glucose Sensor (DEXCOM G7 SENSOR) Does not apply Misc 1 each Every 10 days.    insulin lispro (HUMALOG) 100 UNIT/ML Injection Solution Inject via insulin pump as directed up to TDD = 50 units    [DISCONTINUED] glucagon (GVOKE HYPOPEN 2-PACK) 1 MG/0.2ML Subcutaneous injection Inject 0.2 mL (1 mg total) into the skin once as needed for Low blood glucose (as needed for treatment of severe hypoglycemia).     [DISCONTINUED] Drospirenone-Ethinyl Estradiol (DOROTHY) 3-0.02 MG Oral Tab Take 1 tablet by mouth daily.    [DISCONTINUED] Insulin Pen Needle (TRUEPLUS 5-BEVEL PEN NEEDLES) 32G X 4 MM Does not apply Misc Use up to 5 times daily to inject insulin    Microlet Lancets Does not apply Misc     CONTOUR NEXT TEST In Vitro Strip     Blood Glucose Monitoring Suppl (CONTOUR NEXT EZ) w/Device Does not apply Kit      No current facility-administered medications on file prior to visit.       CMP  (most recent labs)   Lab Results   Component Value Date/Time     (H) 05/07/2024 10:13 AM    BUN 19 05/07/2024 10:13 AM    CREATSERUM 0.92 05/07/2024 10:13 AM    EGFRCR 91 05/07/2024 10:13 AM    CA 9.4 05/07/2024 10:13 AM    ALKPHO 40 (L) 05/07/2024 10:13 AM    AST 16 05/07/2024 10:13 AM    ALT 16 05/07/2024 10:13 AM    BILT 0.7 05/07/2024 10:13 AM    TP 8.0 05/07/2024 10:13 AM    ALB 3.9 05/07/2024 10:13 AM     05/07/2024 10:13 AM    K 4.1 05/07/2024 10:13 AM     05/07/2024 10:13 AM    CO2 27.0 05/07/2024 10:13 AM           Lipids  (most recent labs)   Lab Results   Component Value Date/Time    CHOLEST 235 (H) 05/07/2024 10:13 AM    TRIG 70 05/07/2024 10:13 AM    HDL 83 (H) 05/07/2024 10:13 AM     (H) 05/07/2024 10:13 AM    NONHDLC 152 (H) 05/07/2024 10:13 AM          Diabetes  (most recent labs)   Lab Results   Component Value Date/Time    A1C 6.7 08/06/2024 12:00 AM          Microalb (most recent labs)   Lab Results   Component Value Date/Time    MICROALBCREA 3.9 05/07/2024 10:13 AM        Lab results reviewed with patient.    REVIEW OF SYSTEMS:   GENERAL HEALTH: feels well otherwise  SKIN: denies any unusual skin lesions or rashes  RESPIRATORY: denies shortness of breath with exertion  CARDIOVASCULAR: denies chest pain on exertion  GI: denies abdominal pain and denies heartburn  NEURO: denies headaches     EXAM:   Physical Exam  Constitutional:       Appearance: Normal appearance.   Pulmonary:      Comments:  Able to speak in complete sentences without difficulty  Neurological:      Mental Status: She is alert and oriented to person, place, and time.   Psychiatric:         Mood and Affect: Mood normal.         Behavior: Behavior normal.         ASSESSMENT AND PLAN:   Diabetes:  Patient to increase Lantus to 24 units injection daily.  Decrease Humalog ICR to 1u:12g with breakfast and lunch and continue ICR 1u:10g with dinner.  Continue Humalog ISF 1u:50 mg/dl for target of 110 mg/dl.  Patient will send [employer] message on Tuesday with condition update.   Letter written for clearance to play sports from a diabetes management standpoint.  Patient to continue to use personal Dexcom CGM for glucose monitoring.  Discussed self monitoring blood glucose and the importance of monitoring.  Patient agreed to monitoring qid- before meals and 2 hours postprandial of one meal per day if not using CGM.  Hypoglycemia S&S, Rx, and when to call APRN/CDE reviewed using Rule of 15's. Stressed need to call if 2 readings below 80 mg/dl in 1 week for medication adjustment.  Use Gvoke for treatment of severe hypoglycemia.    DM Health Maintenance  Last dilated eye exam: Last Dilated Eye Exam: 23   Exam shows retinopathy? Eye Exam shows Diabetic Retinopathy?: No    Last diabetic foot exam: Last Foot Exam: 24    Date of last PHQ-2 depression screen: PHQ-2 - Date of last depression screenin2024    Patient  reports that she has never smoked. She has never used smokeless tobacco.  When is flu vaccine due? No recommendations at this time  When is pneumonia vaccine due? No recommendations at this time    The patient indicates understanding of these issues and agrees to the plan.  Refills addressed at time of office visit.    Diagnoses and all orders for this visit:    Type 1 diabetes mellitus without complication (HCC)       Return in about 3 months (around 2024) for 45 minute appointment, Personal CGM.    The risks and benefits  of my recommendations, as well as other treatment options were discussed with the patient today. questions were answered to the best of my knowledge.  Patient verbalizes understanding and amendable to plan of care.  Duration of this service:  15 minutes   Elena BOWEN, BC-ADM, [facility]

## 2024-08-09 NOTE — PATIENT INSTRUCTIONS
Increase Lantus to 24 units injection daily  Decrease Humalog ICR to 1u:12g with breakfast and lunch  Continue Humalog ICR 1u:10g with dinner  Continue Humalog ISF 1u:50 mg/dl for target of 110 mg/dl    Continue to use Dexcom CGM for glucose monitoring  Call office if 2 readings less than 80 mg/dl in one week for dosage adjustment    Send [employer] message on Tuesday with condition update

## 2024-08-19 ENCOUNTER — TELEPHONE (OUTPATIENT)
Dept: ENDOCRINOLOGY CLINIC | Facility: CLINIC | Age: 19
End: 2024-08-19

## 2024-08-19 NOTE — TELEPHONE ENCOUNTER
Jessi increased Lancet from 20 unites to 24 unites Patient has been having more lows . Has been increasing exercise due to preseason volley ball . Please call patient .Patient wants to know what to do .

## 2024-08-19 NOTE — TELEPHONE ENCOUNTER
Patient returned call, states she has been fighting off the lows for awhile, mainly occurring after meals.  Verified meds.  States she is pretty \"loose about the carb counting\", but normally gives 1-2 units less than what she guesstimates.  BG at time of call was 68 mg/dl, states it's coming up but she drank about 3-4 of a full gatorade.  States she had \"a lot of mashed potatoes\" and sausage.  Gave 8 units of humalog.  States she has been off pump since about 07/04/2024, when she is in volleyball season, MDI is easier for her.  Adjustment needed to insulin dosing?    LOV: 08/2024  Future Appointments   Date Time Provider Department Center   12/23/2024 11:00 AM Elena Hill APRN [facility]   12/26/2024  2:00 PM Berenice Monteiro APRN [facility]   Last A1c value was 6.7% done 8/6/2024.      Hypoglycemia triage:   Confirm current Diabetes medications with patient (not from chart note)   Onset, frequency and duration of symptoms?   Obtain blood sugar readings for past 7days : BG log or CGM ( pull report)   Changes in diet? Changes in medications?        Rule of 15 hypoglycemia   Watch for low blood sugars: (less than 70 )    Treatment of Low Blood Glucose Action Plan  1. Check blood glucose to be sure that it is low. You cannot  always go by symptoms or how you feel. If in doubt, treat your low blood glucose anyway.  Rule of 15 :     2. Take 15 grams of carbohydrate (carb). Here are some choices:    4 oz. regular fruit juice  3-4 glucose tablets  6 oz. regular soda   7-8 jelly beans    3. Recheck blood glucose after 10-15 minutes. If blood glucose is still low (less than 70 mg/dl) repeat the treatment (step 2).    4. If your next meal is more than one hour away, eat a small snack.    5. If you’re not sure what caused your low blood glucose, call your healthcare provider.    6. Always check your blood glucose before you drive

## 2024-08-19 NOTE — TELEPHONE ENCOUNTER
Left message to call office.  Patient seen 08/09/2024, lantus was increased to 24 units once daily.  Dexcom report pulled.      -----------------------------  Dexcom Clarity  -----------------------------  Summer Gamble  YOB: 2005  Generated at: Mon, Aug 19, 2024 8:11 AM CDT  Reporting period: Tue Aug 6, 2024 - Mon Aug 19, 2024  -----------------------------  Glucose Details  Average glucose: 141 mg/dL  Standard deviation: 49 mg/dL  GMI: 6.7%  -----------------------------  Time in Range  Very High: 3%  High: 16%  In Range: 77%  Low: 4%  Very Low: <1%    Target Range   mg/dL    -----------------------------  CGM Details  Sensor usage: 93%  Days with CGM data: 13/14

## 2024-08-20 NOTE — TELEPHONE ENCOUNTER
Left message to call back office or respond to My Chart message- no identifiers in voicemail    Sent message as well- please let us know if any further questions.

## 2024-08-20 NOTE — TELEPHONE ENCOUNTER
Please advise patient to decrease Lantus to 22 units injection daily.  Continue Humalog ICR 1u:12g with breakfast, increase ICR 1u:14g with lunch and increase ICR 1u:12u with dinner.  Continue Humalog ISF 1u:15 mg/dl for target of 110 mg/dl.    Elena BOWEN, BC-ADM, [facility]

## 2024-09-11 ENCOUNTER — PATIENT MESSAGE (OUTPATIENT)
Dept: ENDOCRINOLOGY CLINIC | Facility: CLINIC | Age: 19
End: 2024-09-11

## 2024-09-11 NOTE — TELEPHONE ENCOUNTER
Patient is thinking about switching to [employer]- has had many issues with Dexcom- advised to call tech support for possible replacements in meantime. Advised she had spoken with rep at [facility]- should be sending paperwork to office for further review.

## 2024-09-11 NOTE — TELEPHONE ENCOUNTER
From: Summer Gamble  To: Elena Sergio  Sent: 9/11/2024 3:16 PM CDT  Subject: Switch off the dexcom     Hello,   I have been experienced a frustrating amount of errors with my dexcoms the last month or so. So much so that my family and I are looking to switch to the eversense cgm. I am aware that this will prevent me from using my pump for the time being but, to my understanding the company is currently working towards integrating with pumps.   I have already talked to a representative from the company and paperwork should be on the way to the office in [place].   I don’t want negative emotions towards my diabetes management and taking this step feels necessary for that.   Thank you

## 2024-09-12 ENCOUNTER — PATIENT MESSAGE (OUTPATIENT)
Dept: ENDOCRINOLOGY CLINIC | Facility: CLINIC | Age: 19
End: 2024-09-12

## 2024-09-12 RX ORDER — BLOOD-GLUCOSE SENSOR
1 EACH MISCELLANEOUS
Qty: 3 EACH | Refills: 11 | Status: SHIPPED | OUTPATIENT
Start: 2024-09-12

## 2024-09-12 NOTE — TELEPHONE ENCOUNTER
Requested Prescriptions     Pending Prescriptions Disp Refills    Continuous Glucose Sensor (DEXCOM G7 SENSOR) Does not apply Misc 3 each 11     Si each Every 10 days.           Patient is asking for refills        HGBA1C:    Lab Results   Component Value Date    A1C 6.7 2024    A1C 6.4 (H) 2024    A1C 6.1 (A) 2023     (H) 2024     Your Appointments      2024 11:00 AM  Video Visit with JESSI Acevedo  [facility] [address] ([facility] [place]) [address]  [facility] [address] [phone]   Please verify your telehealth insurance benefits prior to your appointment.    You must be in the [facility] during the virtual visit.     Please use the [employer] Mobile Lonnie and launch the video visit 10 minutes prior to your scheduled appointment time to ensure your camera and microphone are working properly. Once the video visit has started you will be placed in a waiting room until the provider begins the visit.     You will receive an email confirmation with instructions.  If you have questions, call your doctor's office directly.    If you are having issues or need to use a desktop/laptop, please follow the below steps:        1.       Close out all other open apps (could be competing for audio resources)  2.       Disable Bluetooth  3.       Reboot mobile device before joining the video  4.       Come off Wi-Fi and switch over to Data    Please see our Video Visit Tip Sheet if you need additional assistance.     If you believe this is an emergency, please dial 911 immediately.            2:00 PM  Adult Physical with JESSI Joshua  [facility] - OB/GYN ([facility]) [address] [place] [address] [phone]   PLEASE NOTE - Most insurances allow a Complete Physical once every 366 days.  Please schedule accordingly.    Please arrive 15 minutes prior to your scheduled appointment. Please also bring your Insurance card, Photo ID, and your medication bottles or a list of your current medication.    If you no longer require this appointment, please contact your physician office to cancel.                [facility]

## 2024-09-12 NOTE — TELEPHONE ENCOUNTER
Pt called in is at school at the moment Rx that went through for patient Dexcom G7 needs PA pt states she is at the pharmacy right now and is completely out of sensors. Informed pt can resubmit PA as urgent     Insurance - same company but mom change pt so it ave more coverage   [place]     Member ID : [id number]  Group [id number]  BIN [id number]  RX PCN:[id number]

## 2024-09-12 NOTE — TELEPHONE ENCOUNTER
Pt called would like these pushed thru she is leaving [facility] Thursday afternoon and just had 2 dexcoms in a row fail her,.

## 2024-09-12 NOTE — TELEPHONE ENCOUNTER
From: Summer Gamble  To: Elena Hill  Sent: 9/12/2024 9:28 AM CDT  Subject: New Insurance Card    Here’s the new insurance cards for the prior authorization

## 2024-09-12 NOTE — TELEPHONE ENCOUNTER
Approved, notified patient and pharmacy.    Summer Gamble (Mary: [id number])  PA Case ID #: [id number]  Rx #: [id number]  Need Help? Call us at [phone]  Outcome  Approved today by [employer]  PA Case: [id number], Status: Approved, Coverage Starts on: 9/12/2024 12:00:00 AM, Coverage Ends on: 9/12/2025 12:00:00 AM.  Authorization Expiration Date: 9/11/2025

## 2024-09-13 ENCOUNTER — MED REC SCAN ONLY (OUTPATIENT)
Dept: ENDOCRINOLOGY CLINIC | Facility: CLINIC | Age: 19
End: 2024-09-13

## 2024-09-16 ENCOUNTER — TELEPHONE (OUTPATIENT)
Dept: ENDOCRINOLOGY CLINIC | Facility: CLINIC | Age: 19
End: 2024-09-16

## 2024-09-20 NOTE — TELEPHONE ENCOUNTER
Per [place] update 9-:    Scheduling Delivery: Order for Eversense Transmitter + 7 other items is in the final stages of being processed

## 2024-10-04 DIAGNOSIS — F41.9 ANXIETY: ICD-10-CM

## 2024-10-04 DIAGNOSIS — E10.9 TYPE 1 DIABETES MELLITUS WITHOUT COMPLICATION (HCC): ICD-10-CM

## 2024-10-04 DIAGNOSIS — L70.9 ACNE, UNSPECIFIED ACNE TYPE: ICD-10-CM

## 2024-10-04 DIAGNOSIS — Z96.41 INSULIN PUMP IN PLACE: ICD-10-CM

## 2024-10-04 RX ORDER — GLUCAGON INJECTION, SOLUTION 1 MG/.2ML
1 INJECTION, SOLUTION SUBCUTANEOUS ONCE AS NEEDED
Qty: 0.4 ML | Refills: 0 | Status: SHIPPED | OUTPATIENT
Start: 2024-10-04 | End: 2024-10-04

## 2024-10-04 RX ORDER — ESCITALOPRAM OXALATE 10 MG/1
15 TABLET ORAL DAILY
Qty: 45 TABLET | Refills: 2 | Status: SHIPPED | OUTPATIENT
Start: 2024-10-04

## 2024-10-04 RX ORDER — DROSPIRENONE AND ETHINYL ESTRADIOL 0.02-3(28)
1 KIT ORAL DAILY
Qty: 84 TABLET | Refills: 0 | Status: SHIPPED | OUTPATIENT
Start: 2024-10-04 | End: 2024-10-28

## 2024-10-04 NOTE — TELEPHONE ENCOUNTER
Requested Prescriptions     Pending Prescriptions Disp Refills    glucagon (GVOKE HYPOPEN 2-PACK) 1 MG/0.2ML Subcutaneous injection 0.4 mL 0     Sig: Inject 0.2 mL (1 mg total) into the skin once as needed for Low blood glucose (as needed for treatment of severe hypoglycemia).     Future Appointments   Date Time Provider Department Center   12/23/2024 11:00 AM Elean Hill APRN [facility]   12/26/2024  2:00 PM Berenice Monteiro APRN [facility] [place]     Your appointments       Date & Time Appointment Department ([place])    Dec 23, 2024 11:00 AM CST Video Visit with Elena Hill APRN [address] ([facility] [place])    Please verify your telehealth insurance benefits prior to your appointment.    You must be in the [facility] during the virtual visit.     Please use the [employer] Lonnie and launch the video visit 10 minutes prior to your scheduled appointment time to ensure your camera and microphone are working properly. Once the video visit has started you will be placed in a waiting room until the provider begins the visit.     You will receive an email confirmation with instructions.  If you have questions, call your doctor's office directly.    If you are having issues or need to use a desktop/laptop, please follow the below steps:        1.       Close out all other open apps (could be competing for audio resources)  2.       Disable Bluetooth  3.       Reboot mobile device before joining the video  4.       Come off Wi-Fi and switch over to Data    Please see our Video Visit Tip Sheet if you need additional assistance.     If you believe this is an emergency, please dial 911 immediately.          Dec 26, 2024 2:00 PM CST Adult Physical with Berenice Monteiro [facility]N [facility] - [facility] ([facility])    PLEASE NOTE - Most insurances allow a Complete Physical  once every 366 days. Please schedule accordingly.    Please arrive 15 minutes prior to your scheduled appointment. Please also bring your Insurance card, Photo ID, and your medication bottles or a list of your current medication.    If you no longer require this appointment, please contact your physician office to cancel.              [address]  [facility] [address] [facility] [id number] [phone] [facility] - OB/GYN  [address]  [address] [id number] [phone]          Last A1c value was 6.7% done 8/6/2024.  Last OV: 08/09/2024  Last refill: 08/08/2024

## 2024-10-04 NOTE — TELEPHONE ENCOUNTER
Last time medication was refilled 05/09/2023  Last office visit  04/11/2024  Next office visit due/scheduled No Future Appointments      No Sig on Medication  
2024

## 2024-10-04 NOTE — TELEPHONE ENCOUNTER
Last time medication was refilled 08/07/2024  Last office visit  04/11/2024  Next office visit due/scheduled No Future Appointments    Medication not on protocol.

## 2024-10-08 ENCOUNTER — TELEPHONE (OUTPATIENT)
Dept: INTERNAL MEDICINE CLINIC | Facility: CLINIC | Age: 19
End: 2024-10-08

## 2024-10-08 NOTE — TELEPHONE ENCOUNTER
CLARIFICATION    Medication requested: CONTOUR NEXT TEST In Vitro Strip     Problem:  Clarify frequency      Additional notes:  Fax place on top bin@ triage

## 2024-10-16 RX ORDER — ACYCLOVIR 400 MG/1
1 TABLET ORAL
Qty: 3 EACH | Refills: 11 | Status: SHIPPED | OUTPATIENT
Start: 2024-10-16 | End: 2024-10-21

## 2024-10-16 NOTE — TELEPHONE ENCOUNTER
Requested Prescriptions     Pending Prescriptions Disp Refills    Continuous Glucose Sensor (DEXCOM G7 SENSOR) Does not apply Misc 3 each 11     Si each Every 10 days.     Future Appointments   Date Time Provider Department Center   2024 11:15 AM Elena Hill APRN [facility]   2024  2:00 PM Berenice Monteiro APRN [facility] OB[facility]GYN [facility] [place]     Last A1c value was 6.7% done 2024.  Refill 2024 Abby   LOV 2024 Abby

## 2024-10-21 DIAGNOSIS — L70.9 ACNE, UNSPECIFIED ACNE TYPE: ICD-10-CM

## 2024-10-21 RX ORDER — ACYCLOVIR 400 MG/1
1 TABLET ORAL
Qty: 3 EACH | Refills: 11 | Status: SHIPPED | OUTPATIENT
Start: 2024-10-21

## 2024-10-21 RX ORDER — DROSPIRENONE AND ETHINYL ESTRADIOL 0.02-3(28)
1 KIT ORAL DAILY
Qty: 84 TABLET | Refills: 0 | OUTPATIENT
Start: 2024-10-21

## 2024-10-21 RX ORDER — PEN NEEDLE, DIABETIC 32GX 5/32"
NEEDLE, DISPOSABLE MISCELLANEOUS
Qty: 450 EACH | Refills: 3 | Status: SHIPPED | OUTPATIENT
Start: 2024-10-21

## 2024-10-21 NOTE — TELEPHONE ENCOUNTER
Requested Prescriptions     Pending Prescriptions Disp Refills    Insulin Pen Needle (TRUEPLUS 5-BEVEL PEN NEEDLES) 32G X 4 MM Does not apply Misc 450 each 3     Sig: Use up to 5 times daily to inject insulin    Continuous Glucose Sensor (DEXCOM G7 SENSOR) Does not apply Misc 3 each 11     Si each Every 10 days.     Future Appointments   Date Time Provider Department Center   2024 11:15 AM Elena Hill APRN [facility]   2024  2:00 PM Berenice Monteiro APRN [facility] OB[facility] [place]     Last A1c value was 6.7% done 2024.  Refill 10/16/24 D.Sergio GONZALES 24 Abby

## 2024-10-28 DIAGNOSIS — L70.9 ACNE, UNSPECIFIED ACNE TYPE: ICD-10-CM

## 2024-10-28 RX ORDER — DROSPIRENONE AND ETHINYL ESTRADIOL 0.02-3(28)
1 KIT ORAL DAILY
Qty: 84 TABLET | Refills: 0 | Status: SHIPPED | OUTPATIENT
Start: 2024-10-28 | End: 2024-12-26

## 2024-11-04 RX ORDER — INSULIN LISPRO 100 [IU]/ML
INJECTION, SOLUTION INTRAVENOUS; SUBCUTANEOUS
Qty: 15 ML | Refills: 0 | Status: SHIPPED | OUTPATIENT
Start: 2024-11-04

## 2024-11-04 NOTE — TELEPHONE ENCOUNTER
Requested Prescriptions     Pending Prescriptions Disp Refills    Insulin Lispro, 1 Unit Dial, (HUMALOG KWIKPEN) 100 UNIT/ML Subcutaneous Solution Pen-injector 15 mL 0     Sig: Inject 3x/day with meals up to TDD = 30 units     Future Appointments   Date Time Provider Department Center   12/23/2024 11:15 AM Elena Hill APRN [facility]   12/26/2024  2:00 PM Berenice Monteiro APRN [facility] OB[facility] [place]     Your appointments       Date & Time Appointment Department ([place])    Dec 23, 2024 11:15 AM CST Video Visit with Elena Hill APRN [employer] [address] ([facility] [place])    Please verify your telehealth insurance benefits prior to your appointment.    You must be in the [facility] during the virtual visit.     Please use the [employer] Mobile Lonnie and launch the video visit 10 minutes prior to your scheduled appointment time to ensure your camera and microphone are working properly. Once the video visit has started you will be placed in a waiting room until the provider begins the visit.     You will receive an email confirmation with instructions.  If you have questions, call your doctor's office directly.    If you are having issues or need to use a desktop/laptop, please follow the below steps:        1.       Close out all other open apps (could be competing for audio resources)  2.       Disable Bluetooth  3.       Reboot mobile device before joining the video  4.       Come off Wi-Fi and switch over to Data    Please see our Video Visit Tip Sheet if you need additional assistance.     If you believe this is an emergency, please dial 911 immediately.          Dec 26, 2024 2:00 PM CST Adult Physical with Berenice [facility]iro [facility]N [facility] - [facility] ([facility])    PLEASE NOTE - Most insurances allow a Complete Physical once every 366 days. Please schedule  accordingly.    Please arrive 15 minutes prior to your scheduled appointment. Please also bring your Insurance card, Photo ID, and your medication bottles or a list of your current medication.    If you no longer require this appointment, please contact your physician office to cancel.              [facility] DIABETES [address] 59 [facility] [phone] [facility] - OB/GYN  [address] [place] [phone] [phone]          Last A1c value was 6.7% done 8/6/2024.  Last OV: 08/09/2024-  Last refill:08/08/2024-

## 2024-11-07 DIAGNOSIS — F41.9 ANXIETY: ICD-10-CM

## 2024-11-08 RX ORDER — ESCITALOPRAM OXALATE 10 MG/1
15 TABLET ORAL DAILY
Qty: 45 TABLET | Refills: 2 | Status: SHIPPED | OUTPATIENT
Start: 2024-11-08

## 2024-11-08 NOTE — TELEPHONE ENCOUNTER
Last time medication was refilled 10/04/2024  Last office visit  04/11/2024  Next office visit due/scheduled No Future Appointments    Medication not on protocol.

## 2024-11-18 RX ORDER — ACYCLOVIR 400 MG/1
1 TABLET ORAL
Qty: 3 EACH | Refills: 11 | Status: SHIPPED | OUTPATIENT
Start: 2024-11-18

## 2024-11-18 NOTE — TELEPHONE ENCOUNTER
Requested Prescriptions     Pending Prescriptions Disp Refills    Continuous Glucose Sensor (DEXCOM G7 SENSOR) Does not apply Misc 3 each 11     Si each Every 10 days.     HGBA1C:    Lab Results   Component Value Date    A1C 6.7 2024    A1C 6.4 (H) 2024    A1C 6.1 (A) 2023     (H) 2024     Your Appointments      2024 11:15 AM  Video Visit with JESSI Acevedo  [facility] [address], [place] ([facility] DIABETES [place]) [address]  [facility] [address] [phone]   Please verify your telehealth insurance benefits prior to your appointment.    You must be in the [facility] during the virtual visit.     Please use the [employer] Mobile Lonnie and launch the video visit 10 minutes prior to your scheduled appointment time to ensure your camera and microphone are working properly. Once the video visit has started you will be placed in a waiting room until the provider begins the visit.     You will receive an email confirmation with instructions.  If you have questions, call your doctor's office directly.    If you are having issues or need to use a desktop/laptop, please follow the below steps:        1.       Close out all other open apps (could be competing for audio resources)  2.       Disable Bluetooth  3.       Reboot mobile device before joining the video  4.       Come off Wi-Fi and switch over to Data    Please see our Video Visit Tip Sheet if you need additional assistance.     If you believe this is an emergency, please dial 911 immediately.            2:00 PM  Adult Physical with JESSI Joshua  [facility], [facility] - OB/GYN ([facility]) [address] [place] [address] [phone]   PLEASE NOTE - Most insurances allow a Complete Physical once every 366 days. Please schedule accordingly.    Please  arrive 15 minutes prior to your scheduled appointment. Please also bring your Insurance card, Photo ID, and your medication bottles or a list of your current medication.    If you no longer require this appointment, please contact your physician office to cancel.              Last OFFICE VISIT: 8-9-2024-DH    Last refill:  10--Dh 3 each with 11 refills-DH    Per last note:    Patient to continue to use personal Dexcom CGM for glucose monitoring.  Discussed self monitoring blood glucose and the importance of monitoring.  Patient agreed to monitoring qid- before meals and 2 hours postprandial of one meal per day if not using CGM.

## 2024-12-11 ENCOUNTER — PATIENT MESSAGE (OUTPATIENT)
Dept: ENDOCRINOLOGY CLINIC | Facility: CLINIC | Age: 19
End: 2024-12-11

## 2024-12-11 RX ORDER — ACYCLOVIR 400 MG/1
1 TABLET ORAL
Qty: 3 EACH | Refills: 11 | Status: SHIPPED | OUTPATIENT
Start: 2024-12-11

## 2024-12-11 NOTE — TELEPHONE ENCOUNTER
Patient sent message - advising having higher blood sugars, especially in morning-     Hyperglycemia triage:   Confirm current Diabetes medications with patient (not from chart note)   Lantus 27 units daily   Humalog ICR to 1u:10-12g with meals. Correction of 3-4 units when >250 mg/dL   Per last visit:  Lantus to 24 units injection daily. Decrease Humalog ICR to 1u:12g with breakfast and lunch and continue ICR 1u:10g with dinner. Continue Humalog ISF 1u:50 mg/dl for target of 110 mg/dl. Continue Humalog ISF 1u:50 mg/dl for target of 110 mg/dl.   Onset, frequency and duration of symptoms?   AM readings 180-250 mg/dL, \"roller coaster\" throughout day.   Some nights - patient dosed related to eating a snack at bedtime - trends at 180 mg/dL next morning  Some nights- going to sleep with trends 120-150 mg/dL and wake up in 250's mg/dL    Has been feeling tired, napping, advises on feeling irritated having to deal with glucose trends    Recent illness or steroid prescription/injection?   Sinus infections, ear infections, and lower respiratory infection, cleared up and been off antibiotics for about a week and a half.   Obtain Blood sugar readings: BG log or CGM?  Patient streaming Dexcom - report emailed to provider and printed to send to scan  Changes in diet? Changes in any medications?   Patient was on antibiotics related to infections- see above - has been off for about a week and a half

## 2024-12-11 NOTE — TELEPHONE ENCOUNTER
Requested Prescriptions     Pending Prescriptions Disp Refills    Continuous Glucose Sensor (DEXCOM G7 SENSOR) Does not apply Misc 3 each 11     Si each Every 10 days.     Future Appointments   Date Time Provider Department Center   2024 11:15 AM Elena Hill APRN [facility]   2024  2:00 PM Berenice Monteiro APRN [facility] OB[facility]N M [facility] [place]     Last A1c value was 6.7% done 2024.  Refill 24 D.Sergio   LOV 24 D.Sergio

## 2024-12-12 DIAGNOSIS — E10.9 TYPE 1 DIABETES MELLITUS WITHOUT COMPLICATION (HCC): ICD-10-CM

## 2024-12-12 DIAGNOSIS — Z96.41 INSULIN PUMP IN PLACE: ICD-10-CM

## 2024-12-12 RX ORDER — INSULIN LISPRO 100 [IU]/ML
INJECTION, SOLUTION INTRAVENOUS; SUBCUTANEOUS
Qty: 50 ML | Refills: 0 | Status: SHIPPED | OUTPATIENT
Start: 2024-12-12

## 2024-12-12 NOTE — TELEPHONE ENCOUNTER
Please call patient to assist with programing insulin pump settings.  Patient has used pump previously.  Patient to start with these settings and will be adjusted as necessary during upcoming scheduled visit on 12/23.      Tandem T-slim Insulin Pump w/Control IQ  Basal rates:   Max basal: 2.0 units/hour     12MN: 0.83 units/hour     Bolus settings:   Max bolus: 20 units     Carbohydrate ratio:   12MN: 1u:12g    Insulin sensitivity:  12MN: 1u:42 mg/dl    BG target:  12MN: 110    Active insulin: 5 hours         Elena BOWEN, BC-ADM, CDCES

## 2024-12-12 NOTE — TELEPHONE ENCOUNTER
Requested Prescriptions     Pending Prescriptions Disp Refills    insulin lispro (HUMALOG) 100 UNIT/ML Injection Solution 50 mL 0     Sig: Inject via insulin pump as directed up to TDD = 50 units     HGBA1C:    Lab Results   Component Value Date    A1C 6.7 08/06/2024    A1C 6.4 (H) 05/07/2024    A1C 6.1 (A) 12/19/2023     (H) 05/07/2024     Your Appointments      Monday December 23, 2024 11:15 AM  Video Visit with JESSI Acevedo  [facility] [address] ([facility]ET[facility] [place]) [address]  [facility] [address] [phone]   Please verify your telehealth insurance benefits prior to your appointment.    You must be in the [facility] during the virtual visit.     Please use the [employer] Mobile Lonnie and launch the video visit 10 minutes prior to your scheduled appointment time to ensure your camera and microphone are working properly. Once the video visit has started you will be placed in a waiting room until the provider begins the visit.     You will receive an email confirmation with instructions.  If you have questions, call your doctor's office directly.    If you are having issues or need to use a desktop/laptop, please follow the below steps:        1.       Close out all other open apps (could be competing for audio resources)  2.       Disable Bluetooth  3.       Reboot mobile device before joining the video  4.       Come off Wi-Fi and switch over to Data    Please see our Video Visit Tip Sheet if you need additional assistance.     If you believe this is an emergency, please dial 911 immediately.           Thursday December 26, 2024 2:00 PM  Adult Physical with JESSI Joshua  [facility], [facility] - OB/GYN ([facility]) [address] [place] [address] [phone]   PLEASE NOTE - Most insurances allow a Complete Physical once every 366 days. Please schedule  accordingly.    Please arrive 15 minutes prior to your scheduled appointment. Please also bring your Insurance card, Photo ID, and your medication bottles or a list of your current medication.    If you no longer require this appointment, please contact your physician office to cancel.              Last OFFICE VISIT: 8-9-2024-DH    Last refill:  6--DH

## 2024-12-12 NOTE — TELEPHONE ENCOUNTER
Call with patient, walked through entering pump settings, which were repeated and confirmed by patient.  Advised will follow up on 12/23/2024, contact office sooner with any urgent concerns.

## 2024-12-18 NOTE — TELEPHONE ENCOUNTER
Requested Prescriptions     Pending Prescriptions Disp Refills    Insulin Lispro, 1 Unit Dial, (HUMALOG KWIKPEN) 100 UNIT/ML Subcutaneous Solution Pen-injector 15 mL 0     Sig: Inject 3x/day with meals up to TDD = 30 units    Continuous Glucose Sensor (DEXCOM G7 SENSOR) Does not apply Misc 3 each 11     Si each Every 10 days.     Your appointments       Date & Time Appointment Department ([place])    Dec 23, 2024 11:15 AM CST Video Visit with Elena Hill APRN [address] ([facility] DIABETES [place])    Please verify your telehealth insurance benefits prior to your appointment.    You must be in the [facility] during the virtual visit.     Please use the [employer] Mobile Lonnie and launch the video visit 10 minutes prior to your scheduled appointment time to ensure your camera and microphone are working properly. Once the video visit has started you will be placed in a waiting room until the provider begins the visit.     You will receive an email confirmation with instructions.  If you have questions, call your doctor's office directly.    If you are having issues or need to use a desktop/laptop, please follow the below steps:        1.       Close out all other open apps (could be competing for audio resources)  2.       Disable Bluetooth  3.       Reboot mobile device before joining the video  4.       Come off Wi-Fi and switch over to Data    Please see our Video Visit Tip Sheet if you need additional assistance.     If you believe this is an emergency, please dial 911 immediately.          Dec 26, 2024 2:00 PM CST Adult Physical with Berenice Monteiro APRN [facility] - OB/GYN ([facility])    PLEASE NOTE - Most insurances allow a Complete Physical once every 366 days. Please schedule accordingly.    Please arrive 15 minutes prior to your scheduled appointment. Please also bring your Insurance  card, Photo ID, and your medication bottles or a list of your current medication.    If you no longer require this appointment, please contact your physician office to cancel.              [facility] [address]  EMG DIABETES [place] [address] [facility] A  [facility] [address] [phone] [facility] - OB/GYN  [address] [place] [address] [phone]          Last A1c value was 6.7% done 8/6/2024.    Refill 12/11/24  LOV 8/9/24  Confirming pharmacy as rx was just sent to [facility] in [place], [place]

## 2024-12-19 NOTE — TELEPHONE ENCOUNTER
Patient has appointment.    Your Appointments      Monday December 23, 2024 11:15 AM  Video Visit with JESSI Acevedo  [facility], [facility] [address], [place] ([facility] DIABETES [place]) [address]  [facility] [address] [phone]   Please verify your telehealth insurance benefits prior to your appointment.    You must be in the [facility] during the virtual visit.     Please use the [employer] Mobile Lonnie and launch the video visit 10 minutes prior to your scheduled appointment time to ensure your camera and microphone are working properly. Once the video visit has started you will be placed in a waiting room until the provider begins the visit.     You will receive an email confirmation with instructions.  If you have questions, call your doctor's office directly.    If you are having issues or need to use a desktop/laptop, please follow the below steps:        1.       Close out all other open apps (could be competing for audio resources)  2.       Disable Bluetooth  3.       Reboot mobile device before joining the video  4.       Come off Wi-Fi and switch over to Data    Please see our Video Visit Tip Sheet if you need additional assistance.     If you believe this is an emergency, please dial 911 immediately.           Thursday December 26, 2024 2:00 PM  Adult Physical with JESSI Joshua  [facility], [facility] - OB/GYN ([facility]) [address] [place] [address] [phone]   PLEASE NOTE - Most insurances allow a Complete Physical once every 366 days. Please schedule accordingly.    Please arrive 15 minutes prior to your scheduled appointment. Please also bring your Insurance card, Photo ID, and your medication bottles or a list of your current medication.    If you no longer require this appointment, please contact your physician office to cancel.

## 2024-12-23 ENCOUNTER — TELEPHONE (OUTPATIENT)
Dept: ENDOCRINOLOGY CLINIC | Facility: CLINIC | Age: 19
End: 2024-12-23

## 2024-12-23 ENCOUNTER — TELEMEDICINE (OUTPATIENT)
Dept: ENDOCRINOLOGY CLINIC | Facility: CLINIC | Age: 19
End: 2024-12-23
Payer: COMMERCIAL

## 2024-12-23 DIAGNOSIS — Z96.41 INSULIN PUMP IN PLACE: ICD-10-CM

## 2024-12-23 DIAGNOSIS — E10.9 TYPE 1 DIABETES MELLITUS WITHOUT COMPLICATION (HCC): Primary | ICD-10-CM

## 2024-12-23 PROCEDURE — 99214 OFFICE O/P EST MOD 30 MIN: CPT | Performed by: NURSE PRACTITIONER

## 2024-12-23 PROCEDURE — 95251 CONT GLUC MNTR ANALYSIS I&R: CPT | Performed by: NURSE PRACTITIONER

## 2024-12-23 RX ORDER — ACYCLOVIR 400 MG/1
1 TABLET ORAL
Qty: 3 EACH | Refills: 11 | Status: SHIPPED | OUTPATIENT
Start: 2024-12-23

## 2024-12-23 RX ORDER — INSULIN LISPRO 100 [IU]/ML
INJECTION, SOLUTION INTRAVENOUS; SUBCUTANEOUS
Qty: 15 ML | Refills: 0 | Status: SHIPPED | OUTPATIENT
Start: 2024-12-23 | End: 2024-12-23 | Stop reason: ALTCHOICE

## 2024-12-23 NOTE — PROGRESS NOTES
HPI:   Summer Gamble is a 19 year old female who presents virtually for the management of her diabetes.   This visit is conducted using Telemedicine with live, two way, interactive video and audio.  Patient verbally consents to Telemedicine visit.  Patient understands and accepts financial responsibility for any deductible, co-insurance and/or co-pays associated with this service.    Chief Complaint: Diabetes Follow Up    Diabetes: stable, at goal  Type: 1   Duration:dxx May 2023  Current Meds: Humalog via insulin pump, Lantus for insulin pump back up plan, Gvoke for treatment of severe hypoglycemia   Long term insulin use: yes  Complications: Hospitalization for blood glucose, DKA w/dx    Tandem T-slim Insulin Pump  Control IQ Use: 82%    Basal rates:   Max basal: 2.0 units/hour     12MN: 0.83 units/hour     Bolus settings:   Max bolus: 20 units     Carbohydrate ratio:   12MN: 1u:12g    Insulin sensitivity:  12MN: 1u:42 mg/dl    BG target:  12MN: 110 mg/dl    Active insulin: 5 hours     TDD insulin: 34.29 units   Basal: 20.14 units (59%)  Bolus: 14.15 units (41%)  Control IQ boluses: 62%  Average daily carbs: 98g      Personal Dexcom CGM  Analysis of data: 12/10/2024 - 12/23/2024  % Time CGM is Active: 86%  Sensor download: full report  in media  Average glucose : 151 mg/dl     Standard deviation: 41 mg/dl   CV (coefficient of variation) : 27%     19% time above 180mg /dl   2.8% time above 250 mg/dl  78% time in target range:  mg/dl   0.2% time below target range: 70mg/dl     Evaluation   1. Baseline glucose stable and in target range  2. Occasional evening postprandial elevation however mostly stays within target range with return to baseline  3. Low likelihood of hypoglycemia  4. Normal glucose variability         Overall glucose control:   HGBA1C:    Lab Results   Component Value Date    A1C 6.7 08/06/2024    A1C 6.4 (H) 05/07/2024    A1C 6.1 (A) 12/19/2023     (H) 05/07/2024          Wt  Readings from Last 3 Encounters:   05/09/24 167 lb (75.8 kg)   12/19/23 166 lb 4.8 oz (75.4 kg)   12/19/23 165 lb (74.8 kg)           Past History:   She  has a past medical history of Anxiety, Diabetes mellitus (HCC) (May 7, 23), Dysmenorrhea, and Type 1 diabetes mellitus (HCC).   Her family history includes Diabetes in her paternal grandmother; Thyroid Disorder in her maternal grandmother.   She  reports that she has never smoked. She has never used smokeless tobacco. She reports current alcohol use. She reports that she does not use drugs.     She has No Known Allergies.     Current Outpatient Medications on File Prior to Visit   Medication Sig    [DISCONTINUED] Insulin Lispro, 1 Unit Dial, (HUMALOG KWIKPEN) 100 UNIT/ML Subcutaneous Solution Pen-injector Inject 3x/day with meals up to TDD = 30 units    Continuous Glucose Sensor (DEXCOM G7 SENSOR) Does not apply Misc 1 each Every 10 days.    insulin lispro (HUMALOG) 100 UNIT/ML Injection Solution Inject via insulin pump as directed up to TDD = 50 units    escitalopram 10 MG Oral Tab Take 1.5 tablets (15 mg total) by mouth daily.    Drospirenone-Ethinyl Estradiol (DOROTHY) 3-0.02 MG Oral Tab Take 1 tablet by mouth daily.    Insulin Pen Needle (TRUEPLUS 5-BEVEL PEN NEEDLES) 32G X 4 MM Does not apply Misc Use up to 5 times daily to inject insulin    CONTOUR NEXT TEST In Vitro Strip Use as directed    glucagon (GVOKE HYPOPEN 2-PACK) 1 MG/0.2ML Subcutaneous injection Inject 0.2 mL (1 mg total) into the skin once as needed for Low blood glucose (as needed for treatment of severe hypoglycemia).    insulin glargine (LANTUS SOLOSTAR) 100 UNIT/ML Subcutaneous Solution Pen-injector Inject daily as directed up to TDD = 30 units  FOR INSULIN PUMP BACK UP ONLY    Insulin Pen Needle (BD PEN NEEDLE SANDRITA U/F) 32G X 4 MM Does not apply Misc Inject 1 Pen into the skin 4 (four) times daily.    Microlet Lancets Does not apply Misc     Blood Glucose Monitoring Suppl (CONTOUR NEXT EZ)  w/Device Does not apply Kit      No current facility-administered medications on file prior to visit.       CMP  (most recent labs)   Lab Results   Component Value Date/Time     (H) 05/07/2024 10:13 AM    BUN 19 05/07/2024 10:13 AM    CREATSERUM 0.92 05/07/2024 10:13 AM    EGFRCR 91 05/07/2024 10:13 AM    CA 9.4 05/07/2024 10:13 AM    ALKPHO 40 (L) 05/07/2024 10:13 AM    AST 16 05/07/2024 10:13 AM    ALT 16 05/07/2024 10:13 AM    BILT 0.7 05/07/2024 10:13 AM    TP 8.0 05/07/2024 10:13 AM    ALB 3.9 05/07/2024 10:13 AM     05/07/2024 10:13 AM    K 4.1 05/07/2024 10:13 AM     05/07/2024 10:13 AM    CO2 27.0 05/07/2024 10:13 AM           Lipids  (most recent labs)   Lab Results   Component Value Date/Time    CHOLEST 235 (H) 05/07/2024 10:13 AM    TRIG 70 05/07/2024 10:13 AM    HDL 83 (H) 05/07/2024 10:13 AM     (H) 05/07/2024 10:13 AM    NONHDLC 152 (H) 05/07/2024 10:13 AM          Diabetes  (most recent labs)   Lab Results   Component Value Date/Time    A1C 6.7 08/06/2024 12:00 AM          Microalb (most recent labs)   Lab Results   Component Value Date/Time    MICROALBCREA 3.9 05/07/2024 10:13 AM        Lab results reviewed with patient.    REVIEW OF SYSTEMS:   GENERAL HEALTH: feels well otherwise  SKIN: denies any unusual skin lesions or rashes  RESPIRATORY: denies shortness of breath with exertion  CARDIOVASCULAR: denies chest pain on exertion  GI: denies abdominal pain and denies heartburn  NEURO: denies headaches     EXAM:   Physical Exam  Constitutional:       Appearance: Normal appearance.   Pulmonary:      Comments: Able to speak in complete sentences without difficulty  Neurological:      Mental Status: She is alert and oriented to person, place, and time.   Psychiatric:         Mood and Affect: Mood normal.         Behavior: Behavior normal.         ASSESSMENT AND PLAN:   Diabetes:  Patient to continue Humalog via insulin pump, Lantus for insulin pump back up plan and Gvoke for  treatment of severe hypoglycemia  No adjustments to insulin pump settings at this time:  Tandem T-slim Insulin Pump  Basal rates:   Max basal: 2.0 units/hour     12MN: 0.83 units/hour     Bolus settings:   Max bolus: 20 units     Carbohydrate ratio:   12MN: 1u:12g    Insulin sensitivity:  12MN: 1u:42 mg/dl    BG target:  12MN: 110 mg/dl    Active insulin: 5 hours     Insulin pump back up plan:  Give Lantus 20 units injection daily and continue Humalog ICR 1u:12g and ISF 1u:42 mg/dl for target of 110 mg/dl.  For mechanical issues contact Tandem.    Patient to continue to use personal Dexcom CGM for glucose monitoring until able to start Eversense CGM .  Discussed self monitoring blood glucose and the importance of monitoring.  Patient agreed to monitoring qid- before meals and 2 hours postprandial of one meal per day if not using CGM.  Patient is aware that using Eversense CGM will prohibit using Control IQ and will be limited to manual settings on pump.  Hypoglycemia S&S, Rx, and when to call APRN/CDE reviewed using Rule of 15's. Stressed need to call if 2 readings below 80 mg/dl in 1 week for medication adjustment.  Use Gvoke for treatment of severe hypoglycemia.  Reinforced healthy eating in healthy portions and increasing daily physical activity.  Patient will have updated A1c done this week.    DM Health Maintenance  Last dilated eye exam: No data recorded   Exam shows retinopathy? No data recorded - scheduled for tomorrow    Last diabetic foot exam: Last Foot Exam: 24    Date of last PHQ-2 depression screen: PHQ-2 - Date of last depression screenin2024    Patient  reports that she has never smoked. She has never used smokeless tobacco.  When is flu vaccine due? Influenza Vaccine(1) Never done  When is pneumonia vaccine due? No recommendations at this time    The patient indicates understanding of these issues and agrees to the plan.  Refills addressed at time of office visit.    Diagnoses and all  orders for this visit:    Type 1 diabetes mellitus without complication (HCC)  -     Hemoglobin A1C [E]; Future  -     Comp Metabolic Panel (14) [E]; Future  -     Lipid Panel [E]; Future  -     Hemoglobin A1C [E]; Future  -     Microalb/Creat Ratio, Random Urine [E]; Future    Insulin pump in place         Return in about 3 months (around 3/23/2025) for 45 minute appointment, Personal CGM, Insulin pump.    The risks and benefits of my recommendations, as well as other treatment options were discussed with the patient today. questions were answered to the best of my knowledge.  Patient verbalizes understanding and amendable to plan of care.  Duration of this service:  15 minutes   Elena BOWEN, BC-ADM, [facility]

## 2024-12-23 NOTE — TELEPHONE ENCOUNTER
Patient has appointment with [facility] 12-.      Will show in care everywhere.    Pending TE to 12-.

## 2024-12-23 NOTE — PROGRESS NOTES
Summer Gamble  YOB: 2005  Last upload date: Dec 23, 2024, 8:03 AM  t:slim X2 (#[id number])    4 Weeks (Nov 26 - Dec 23, 2024)  Target Glucose Range: 70 - 180 mg/dL    CGM summary    Average reading           151 mg/dL                       Time in range             78%                             Time CGM in use           86%                             Standard deviation        41 mg/dL                        Coefficient of variation  27%                             GMI                       Requires 12 days/70% CGM use          Time in range comparison    Current 4 weeks      >  250 mg/dL       2.8%               181  -  250 mg/dL  19%                70  -  180 mg/dL   78%                54  -  69 mg/dL    0.2%               <  54 mg/dL        0%                   Previous 4 weeks     >  250 mg/dL       -%                 181  -  250 mg/dL  -%                 70  -  180 mg/dL   -%                 54  -  69 mg/dL    -%                 <  54 mg/dL        -%                       Control-IQ summary    Time active      82%              8d 21 hr           CGM inactive     14%              1d 12 hr           Control-IQ off   0%               0 hr               Pump inactive    4%               11 hr                Average sleep    Duration         7 hr               Weekly           7 times              Average exercise    Duration         0 hr               Weekly           0 times                  Insulin summary    Average daily dose     34.29 units        Basal                  59%              20.14 units        Bolus                  41%              14.15 units        Average daily boluses  7 boluses          Manual                 38%              3 boluses          Control-IQ             62%              4 boluses          Average daily carbs    98g                      Bolus review    Type               Food             57%              8.11 units         Correction       12%              1.71  units         Override         1%               0.13 units         Control-IQ       30%              4.20 units           Delivery Method    Standard         69%              9.77 units         Extended         1%               0.17 units         Quick            0%               0.00 units         Control-IQ       30%              4.20 units               Load activity    Cartridge change  every 3.1d         Tubing fill       every 3.1d         Cannula fill      every 3.1d

## 2024-12-26 ENCOUNTER — OFFICE VISIT (OUTPATIENT)
Facility: CLINIC | Age: 19
End: 2024-12-26
Payer: COMMERCIAL

## 2024-12-26 VITALS
HEIGHT: 70 IN | HEART RATE: 85 BPM | WEIGHT: 177 LBS | BODY MASS INDEX: 25.34 KG/M2 | SYSTOLIC BLOOD PRESSURE: 118 MMHG | DIASTOLIC BLOOD PRESSURE: 70 MMHG

## 2024-12-26 DIAGNOSIS — Z11.3 ROUTINE SCREENING FOR STI (SEXUALLY TRANSMITTED INFECTION): ICD-10-CM

## 2024-12-26 DIAGNOSIS — Z83.49 FAMILY HISTORY OF THYROID DISEASE: ICD-10-CM

## 2024-12-26 DIAGNOSIS — Z23 NEED FOR HPV VACCINE: ICD-10-CM

## 2024-12-26 DIAGNOSIS — L70.9 ACNE, UNSPECIFIED ACNE TYPE: ICD-10-CM

## 2024-12-26 DIAGNOSIS — N89.8 VAGINAL DISCHARGE: ICD-10-CM

## 2024-12-26 DIAGNOSIS — Z12.4 CERVICAL CANCER SCREENING: ICD-10-CM

## 2024-12-26 DIAGNOSIS — Z01.419 ENCOUNTER FOR WELL WOMAN EXAM WITH ROUTINE GYNECOLOGICAL EXAM: Primary | ICD-10-CM

## 2024-12-26 DIAGNOSIS — Z30.09 COUNSELING FOR BIRTH CONTROL REGARDING INTRAUTERINE DEVICE (IUD): ICD-10-CM

## 2024-12-26 PROCEDURE — 99395 PREV VISIT EST AGE 18-39: CPT

## 2024-12-26 PROCEDURE — 88175 CYTOPATH C/V AUTO FLUID REDO: CPT

## 2024-12-26 PROCEDURE — 90471 IMMUNIZATION ADMIN: CPT

## 2024-12-26 PROCEDURE — 3074F SYST BP LT 130 MM HG: CPT

## 2024-12-26 PROCEDURE — 87591 N.GONORRHOEAE DNA AMP PROB: CPT

## 2024-12-26 PROCEDURE — 3078F DIAST BP <80 MM HG: CPT

## 2024-12-26 PROCEDURE — 87491 CHLMYD TRACH DNA AMP PROBE: CPT

## 2024-12-26 PROCEDURE — 3008F BODY MASS INDEX DOCD: CPT

## 2024-12-26 PROCEDURE — 90651 9VHPV VACCINE 2/3 DOSE IM: CPT

## 2024-12-26 RX ORDER — FERROUS SULFATE 325(65) MG
TABLET ORAL
COMMUNITY
Start: 2024-12-12

## 2024-12-26 RX ORDER — FLUCONAZOLE 150 MG/1
TABLET ORAL
Qty: 2 TABLET | Refills: 0 | Status: SHIPPED | OUTPATIENT
Start: 2024-12-26

## 2024-12-26 RX ORDER — DROSPIRENONE AND ETHINYL ESTRADIOL 0.02-3(28)
1 KIT ORAL DAILY
Qty: 84 TABLET | Refills: 3 | Status: SHIPPED | OUTPATIENT
Start: 2024-12-26

## 2024-12-26 NOTE — PROGRESS NOTES
Summer Gamble is a 19 year old female  Patient's last menstrual period was 10/27/2024 (exact date).   Chief Complaint   Patient presents with    Wellness Visit     First pap  Reviewed Preventative/Wellness form with patient.    .  HPV vaccine not up to date, received one dose. Agrees to having second dose today. 3rd dose in 6 months.   Flu vaccine discussed -declined   PCP. Dr. Todd she sees for yearly exams     OBSTETRICS HISTORY:  OB History    Para Term  AB Living   0 0 0 0 0 0   SAB IAB Ectopic Multiple Live Births   0 0 0 0 0       GYNE HISTORY:  Periods irregular light    History   Sexual Activity    Sexual activity: Yes    Partners: Male    Birth control/ protection: OCP        Hx Prior Abnormal Pap:  (Never had a pap done)        MEDICAL HISTORY:  Past Medical History:    Anxiety    I was just started on lexapro recently    Diabetes mellitus (HCC)    Dysmenorrhea    Super crampy day 1 and 2 like bed bound    Type 1 diabetes mellitus (HCC)       SURGICAL HISTORY:  History reviewed. No pertinent surgical history.    SOCIAL HISTORY:  Social History     Socioeconomic History    Marital status: Single     Spouse name: Not on file    Number of children: Not on file    Years of education: Not on file    Highest education level: Not on file   Occupational History    Not on file   Tobacco Use    Smoking status: Never    Smokeless tobacco: Never   Vaping Use    Vaping status: Never Used   Substance and Sexual Activity    Alcohol use: Yes     Comment: occ    Drug use: No    Sexual activity: Yes     Partners: Male     Birth control/protection: OCP   Other Topics Concern     Service Not Asked    Blood Transfusions Not Asked    Caffeine Concern No    Occupational Exposure Not Asked    Hobby Hazards Not Asked    Sleep Concern Not Asked    Stress Concern No    Weight Concern No    Special Diet No    Back Care Not Asked    Exercise No    Bike Helmet Not Asked    Seat Belt No    Self-Exams Not  Asked   Social History Narrative    Not on file     Social Drivers of Health     Financial Resource Strain: Not on file   Food Insecurity: Not on file   Transportation Needs: Not on file   Physical Activity: Not on file   Stress: Not on file   Social Connections: Not on file   Housing Stability: Not on file       FAMILY HISTORY:  Family History   Problem Relation Age of Onset    Diabetes Paternal Grandmother     Thyroid Disorder Maternal Grandmother        MEDICATIONS:    Current Outpatient Medications:     Ferrous Sulfate 325 (65 Fe) MG Oral Tab, Take by mouth., Disp: , Rfl:     Drospirenone-Ethinyl Estradiol (DOROTHY) 3-0.02 MG Oral Tab, Take 1 tablet by mouth daily., Disp: 84 tablet, Rfl: 3    fluconazole (DIFLUCAN) 150 MG Oral Tab, Take 1 tablet by mouth now. May repeat in 72 hrs if symptoms persist. Please call office if not resolved after 2 doses., Disp: 2 tablet, Rfl: 0    Continuous Glucose Sensor (DEXCOM G7 SENSOR) Does not apply Misc, 1 each Every 10 days., Disp: 3 each, Rfl: 11    insulin lispro (HUMALOG) 100 UNIT/ML Injection Solution, Inject via insulin pump as directed up to TDD = 50 units, Disp: 50 mL, Rfl: 0    escitalopram 10 MG Oral Tab, Take 1.5 tablets (15 mg total) by mouth daily., Disp: 45 tablet, Rfl: 2    Insulin Pen Needle (TRUEPLUS 5-BEVEL PEN NEEDLES) 32G X 4 MM Does not apply Misc, Use up to 5 times daily to inject insulin, Disp: 450 each, Rfl: 3    CONTOUR NEXT TEST In Vitro Strip, Use as directed, Disp: 100 strip, Rfl: 0    insulin glargine (LANTUS SOLOSTAR) 100 UNIT/ML Subcutaneous Solution Pen-injector, Inject daily as directed up to TDD = 30 units  FOR INSULIN PUMP BACK UP ONLY, Disp: 15 mL, Rfl: 0    Insulin Pen Needle (BD PEN NEEDLE SANDRITA U/F) 32G X 4 MM Does not apply Misc, Inject 1 Pen into the skin 4 (four) times daily., Disp: 400 each, Rfl: 0    Microlet Lancets Does not apply Misc, , Disp: , Rfl:     Blood Glucose Monitoring Suppl (CONTOUR NEXT EZ) w/Device Does not apply Kit, ,  Disp: , Rfl:     glucagon (GVOKE HYPOPEN 2-PACK) 1 MG/0.2ML Subcutaneous injection, Inject 0.2 mL (1 mg total) into the skin once as needed for Low blood glucose (as needed for treatment of severe hypoglycemia)., Disp: 0.4 mL, Rfl: 0    ALLERGIES:  Allergies[1]      Review of Systems:  Constitutional:  Denies fatigue, night sweats, hot flashes  Eyes:  denies blurred or double vision  Cardiovascular:  denies chest pain or palpitations  Respiratory:  denies shortness of breath  Gastrointestinal:  denies heartburn, abdominal pain, diarrhea or constipation  Genitourinary:  denies dysuria, incontinence, abnormal vaginal discharge, vaginal itching  Musculoskeletal:  denies back pain.  Skin/Breast:  Denies any breast pain, lumps, or discharge.   Neurological:  denies headaches, extremity weakness or numbness.  Psychiatric: denies depression or anxiety.  Endocrine:   denies excessive thirst or urination.  Heme/Lymph:  denies history of anemia, easy bruising or bleeding.      PHYSICAL EXAM:   Constitutional: well developed, well nourished  Head/Face: normocephalic  Neck/Thyroid: thyroid symmetric, no thyromegaly, no nodules, no adenopathy  Lymphatic:no abnormal supraclavicular or axillary adenopathy is noted  Breast: normal without palpable masses, tenderness, asymmetry, nipple discharge, nipple retraction or skin changes  Abdomen:  soft, nontender, nondistended, no masses  Skin/Hair: no unusual rashes or bruises  Extremities: no edema, no cyanosis  Psychiatric:  Oriented to time, place, person and situation. Appropriate mood and affect    Pelvic Exam:  External Genitalia: normal appearance, hair distribution, and no lesions  Urethral Meatus:  normal in size, location, without lesions and prolapse  Bladder:  No fullness, masses or tenderness  Vagina:  Normal appearance without lesions, no abnormal discharge  Cervix:  Normal without tenderness on motion  Uterus: normal in size, contour, position, mobility, without  tenderness  Adnexa: normal without masses or tenderness  Perineum: normal  Anus: no hemorroids     Assessment & Plan:  Diagnoses and all orders for this visit:    Encounter for well woman exam with routine gynecological exam    Acne, unspecified acne type  -     Drospirenone-Ethinyl Estradiol (DOROTHY) 3-0.02 MG Oral Tab; Take 1 tablet by mouth daily.    Vaginal discharge  -     fluconazole (DIFLUCAN) 150 MG Oral Tab; Take 1 tablet by mouth now. May repeat in 72 hrs if symptoms persist. Please call office if not resolved after 2 doses.    Need for HPV vaccine  -     Gardasil 9 [[id number]]    Cervical cancer screening  -     ThinPrep PAP with HPV Reflex Request B; Future    Routine screening for STI (sexually transmitted infection)  -     Chlamydia/Gc Amplification; Future    Counseling for birth control regarding intrauterine device (IUD)      She is having trouble remembering to take her Dorothy daily, would like an IUD. Mirena vs Paragard discussed. Would like the Mirena, but is leaving for school on Dec 31. Advise her to try [facility] before she leaves.   If decides to have it done at our office, will need 200 mcg vaginally Cytotec the night before the procedure.              [1] No Known Allergies

## 2024-12-27 LAB
C TRACH DNA SPEC QL NAA+PROBE: NEGATIVE
N GONORRHOEA DNA SPEC QL NAA+PROBE: NEGATIVE

## 2025-01-02 LAB
.: NORMAL
.: NORMAL

## 2025-01-15 DIAGNOSIS — Z96.41 INSULIN PUMP IN PLACE: ICD-10-CM

## 2025-01-15 DIAGNOSIS — L70.9 ACNE, UNSPECIFIED ACNE TYPE: ICD-10-CM

## 2025-01-15 DIAGNOSIS — E10.9 TYPE 1 DIABETES MELLITUS WITHOUT COMPLICATION (HCC): ICD-10-CM

## 2025-01-16 RX ORDER — INSULIN LISPRO 100 [IU]/ML
INJECTION, SOLUTION INTRAVENOUS; SUBCUTANEOUS
Qty: 50 ML | Refills: 0 | Status: SHIPPED | OUTPATIENT
Start: 2025-01-16

## 2025-01-16 RX ORDER — DROSPIRENONE AND ETHINYL ESTRADIOL 0.02-3(28)
1 KIT ORAL DAILY
Qty: 84 TABLET | Refills: 3 | OUTPATIENT
Start: 2025-01-16

## 2025-01-16 NOTE — TELEPHONE ENCOUNTER
Requested Prescriptions     Pending Prescriptions Disp Refills    insulin lispro (HUMALOG) 100 UNIT/ML Injection Solution 50 mL 0     Sig: Inject via insulin pump as directed up to TDD = 50 units     Future Appointments   Date Time Provider Department Center   4/21/2025  7:30 AM Elena Hill APRN [id number]     Last A1c value was 6.7% done 8/6/2024.  Refill 12/12/24 D.Sergio   LOV 12/23/24  Abby

## 2025-04-04 DIAGNOSIS — Z96.41 INSULIN PUMP IN PLACE: ICD-10-CM

## 2025-04-04 DIAGNOSIS — L70.9 ACNE, UNSPECIFIED ACNE TYPE: ICD-10-CM

## 2025-04-04 DIAGNOSIS — E10.9 TYPE 1 DIABETES MELLITUS WITHOUT COMPLICATION (HCC): ICD-10-CM

## 2025-04-04 RX ORDER — DROSPIRENONE AND ETHINYL ESTRADIOL 0.02-3(28)
1 KIT ORAL DAILY
Qty: 84 TABLET | Refills: 3 | OUTPATIENT
Start: 2025-04-04

## 2025-04-04 RX ORDER — INSULIN LISPRO 100 [IU]/ML
INJECTION, SOLUTION INTRAVENOUS; SUBCUTANEOUS
Qty: 50 ML | Refills: 0 | Status: SHIPPED | OUTPATIENT
Start: 2025-04-04

## 2025-04-04 NOTE — TELEPHONE ENCOUNTER
Requested Prescriptions     Pending Prescriptions Disp Refills    insulin lispro (HUMALOG) 100 UNIT/ML Injection Solution 50 mL 0     Sig: Inject via insulin pump as directed up to TDD = 50 units     HGBA1C:    Lab Results   Component Value Date    A1C 6.7 08/06/2024    A1C 6.4 (H) 05/07/2024    A1C 6.1 (A) 12/19/2023     (H) 05/07/2024     Your Appointments      Monday April 21, 2025 7:30 AM  Diabetes Pump follow up with JESSI Acevedo  [facility] [address] ([facility] [place] [address] [place] [address] [phone]               Last Office Visit:  12--    Last Refill:  1--

## 2025-04-08 DIAGNOSIS — L70.9 ACNE, UNSPECIFIED ACNE TYPE: ICD-10-CM

## 2025-04-08 RX ORDER — DROSPIRENONE AND ETHINYL ESTRADIOL TABLETS 0.02-3(28)
1 KIT ORAL DAILY
Qty: 84 TABLET | Refills: 3 | OUTPATIENT
Start: 2025-04-08

## 2025-04-17 DIAGNOSIS — E10.9 TYPE 1 DIABETES MELLITUS WITHOUT COMPLICATION (HCC): Primary | ICD-10-CM

## 2025-04-17 NOTE — TELEPHONE ENCOUNTER
Patient called in stating she had to go off her pump about 3 weeks ago due to cost.  Father has new job/insurance.  Pump supplies were about $100/month, but are now over $230.  Patient has been using [facility] since.  Started with back up plan of Lantus 20 units once daily, but glucose was too high.  Is now taking Lantus 28 units once daily, ICR 1:10 and ISF between 30 and 40.  Using EverseneVestment Continuous Glucose Monitor, sent email to connect to our practice.  Needs humalog and lantus refill to [facility] in [place].  Sent info for [employer] savings card since insulin was also expensive at pharmacy.    Fasting glucose this morning was 104 mg/dl, time of call glucose was 283 mg/ml but patient reports she had a [employer] and forgot to bolus, but has since corrected.  Denies symptoms of hyperglycemia, aside from occasional dry mouth.  Fasting numbers have been ranging 110-150 per patient report.

## 2025-04-18 ENCOUNTER — LAB ENCOUNTER (OUTPATIENT)
Dept: LAB | Age: 20
End: 2025-04-18
Attending: NURSE PRACTITIONER
Payer: COMMERCIAL

## 2025-04-18 DIAGNOSIS — E10.9 TYPE 1 DIABETES MELLITUS WITHOUT COMPLICATION (HCC): ICD-10-CM

## 2025-04-18 DIAGNOSIS — Z83.49 FAMILY HISTORY OF THYROID DISEASE: ICD-10-CM

## 2025-04-18 LAB
ALBUMIN SERPL-MCNC: 4.8 G/DL (ref 3.2–4.8)
ALBUMIN/GLOB SERPL: 1.7 {RATIO} (ref 1–2)
ALP LIVER SERPL-CCNC: 48 U/L (ref 52–144)
ALT SERPL-CCNC: 18 U/L (ref 10–49)
ANION GAP SERPL CALC-SCNC: 10 MMOL/L (ref 0–18)
AST SERPL-CCNC: 21 U/L (ref ?–34)
BILIRUB SERPL-MCNC: 1.2 MG/DL (ref 0.3–1.2)
BUN BLD-MCNC: 11 MG/DL (ref 9–23)
CALCIUM BLD-MCNC: 10.2 MG/DL (ref 8.7–10.6)
CHLORIDE SERPL-SCNC: 103 MMOL/L (ref 98–112)
CHOLEST SERPL-MCNC: 233 MG/DL (ref ?–200)
CO2 SERPL-SCNC: 27 MMOL/L (ref 21–32)
CREAT BLD-MCNC: 0.98 MG/DL (ref 0.55–1.02)
CREAT UR-SCNC: 215 MG/DL
EGFRCR SERPLBLD CKD-EPI 2021: 84 ML/MIN/1.73M2 (ref 60–?)
EST. AVERAGE GLUCOSE BLD GHB EST-MCNC: 163 MG/DL (ref 68–126)
FASTING PATIENT LIPID ANSWER: YES
FASTING STATUS PATIENT QL REPORTED: YES
GLOBULIN PLAS-MCNC: 2.8 G/DL (ref 2–3.5)
GLUCOSE BLD-MCNC: 225 MG/DL (ref 70–99)
HBA1C MFR BLD: 7.3 % (ref ?–5.7)
HDLC SERPL-MCNC: 85 MG/DL (ref 40–59)
LDLC SERPL CALC-MCNC: 136 MG/DL (ref ?–100)
MICROALBUMIN UR-MCNC: 0.4 MG/DL
MICROALBUMIN/CREAT 24H UR-RTO: 1.9 UG/MG (ref ?–30)
NONHDLC SERPL-MCNC: 148 MG/DL (ref ?–130)
OSMOLALITY SERPL CALC.SUM OF ELEC: 296 MOSM/KG (ref 275–295)
POTASSIUM SERPL-SCNC: 4.3 MMOL/L (ref 3.5–5.1)
PROT SERPL-MCNC: 7.6 G/DL (ref 5.7–8.2)
SODIUM SERPL-SCNC: 140 MMOL/L (ref 136–145)
T4 FREE SERPL-MCNC: 1.4 NG/DL (ref 0.8–1.7)
TRIGL SERPL-MCNC: 69 MG/DL (ref 30–149)
TSI SER-ACNC: 3.05 UIU/ML (ref 0.55–4.78)
VLDLC SERPL CALC-MCNC: 13 MG/DL (ref 0–30)

## 2025-04-18 PROCEDURE — 80053 COMPREHEN METABOLIC PANEL: CPT

## 2025-04-18 PROCEDURE — 84439 ASSAY OF FREE THYROXINE: CPT

## 2025-04-18 PROCEDURE — 80061 LIPID PANEL: CPT

## 2025-04-18 PROCEDURE — 82570 ASSAY OF URINE CREATININE: CPT

## 2025-04-18 PROCEDURE — 84443 ASSAY THYROID STIM HORMONE: CPT

## 2025-04-18 PROCEDURE — 82043 UR ALBUMIN QUANTITATIVE: CPT

## 2025-04-18 PROCEDURE — 36415 COLL VENOUS BLD VENIPUNCTURE: CPT

## 2025-04-18 PROCEDURE — 83036 HEMOGLOBIN GLYCOSYLATED A1C: CPT

## 2025-04-18 RX ORDER — INSULIN GLARGINE 100 [IU]/ML
INJECTION, SOLUTION SUBCUTANEOUS
Qty: 30 ML | Refills: 1 | Status: SHIPPED | OUTPATIENT
Start: 2025-04-18 | End: 2025-04-21

## 2025-04-18 RX ORDER — INSULIN LISPRO 100 [IU]/ML
INJECTION, SOLUTION INTRAVENOUS; SUBCUTANEOUS
Qty: 45 ML | Refills: 1 | Status: SHIPPED | OUTPATIENT
Start: 2025-04-18

## 2025-04-18 NOTE — TELEPHONE ENCOUNTER
Patient called office - call transferred to RN - patient advised she is needing re-fills on Lantus and Humalog insulin - definitely needs Lantus by tomorrow - prescriptions pended to requested pharmacy - [facility] located at [address], normal pharmacy does not have medications in stock at this time. Prescriptions pended. No further questions/concerns at this time.     Last office visit and Provider: 12/31/24 - JESSI Acevedo  Future Appointments   Date Time Provider Department Center   4/21/2025  7:30 AM Elena Hill APRN [facility]   Last A1c value was 6.7% done 8/6/2024.

## 2025-04-21 ENCOUNTER — OFFICE VISIT (OUTPATIENT)
Dept: ENDOCRINOLOGY CLINIC | Facility: CLINIC | Age: 20
End: 2025-04-21
Payer: COMMERCIAL

## 2025-04-21 VITALS
HEART RATE: 78 BPM | BODY MASS INDEX: 25 KG/M2 | WEIGHT: 177 LBS | SYSTOLIC BLOOD PRESSURE: 120 MMHG | RESPIRATION RATE: 18 BRPM | DIASTOLIC BLOOD PRESSURE: 64 MMHG

## 2025-04-21 DIAGNOSIS — E10.9 TYPE 1 DIABETES MELLITUS WITHOUT COMPLICATION (HCC): Primary | ICD-10-CM

## 2025-04-21 DIAGNOSIS — E78.2 MIXED HYPERLIPIDEMIA: ICD-10-CM

## 2025-04-21 RX ORDER — INSULIN GLARGINE 100 [IU]/ML
INJECTION, SOLUTION SUBCUTANEOUS
Qty: 15 ML | Refills: 0 | Status: CANCELLED | OUTPATIENT
Start: 2025-04-21

## 2025-04-21 RX ORDER — INSULIN PMP CART,AUT,G6/7,CNTR
1 EACH SUBCUTANEOUS ONCE
Qty: 1 KIT | Refills: 0 | Status: SHIPPED | OUTPATIENT
Start: 2025-04-21 | End: 2025-04-21

## 2025-04-21 RX ORDER — INSULIN DEGLUDEC 200 U/ML
INJECTION, SOLUTION SUBCUTANEOUS
Qty: 3 ML | Refills: 0 | COMMUNITY
Start: 2025-04-21

## 2025-04-21 RX ORDER — INSULIN DEGLUDEC 100 U/ML
INJECTION, SOLUTION SUBCUTANEOUS
Qty: 15 ML | Refills: 1 | Status: SHIPPED | OUTPATIENT
Start: 2025-04-21

## 2025-04-21 RX ORDER — INSULIN PMP CART,AUT,G6/7,CNTR
1 EACH SUBCUTANEOUS
Qty: 10 EACH | Refills: 2 | Status: SHIPPED | OUTPATIENT
Start: 2025-04-21

## 2025-04-21 NOTE — PROGRESS NOTES
HPI:   Summer Gamble is a 19 year old female who presents for follow up for the management of her diabetes.  Patient was last seen in office 5/2024, she [place].  Patient is using Eversense CGM and has recently discontinue insulin pump therapy with Tandem T-slim insulin pump due to cost.    Chief Complaint   Patient presents with    Diabetes     Follow up-Tslim/eversense       Diabetes: stable, needs further improvement  Type: 1   Duration:dx May 2023  Current Meds: Humalog ICR 1u:10-12g and ISF 1u:40 mg/dl for target of 110 mg/dl, Lantus 28 units injection daily, Gvoke for treatment of severe hypoglycemia   Long term insulin use: yes  Complications: Hospitalization for DKA when diagnosed    Personal Eversense CGM  Analysis of data: 4/8/2025 - 4/21/2025  % Time CGM is Active: 79.9%  Sensor download: full report  in media  Average glucose : 173 mg/dl   GMI: 7.4%    CV (coefficient of variation) : 33%     35% time above 180mg /dl   10% time above 250 mg/dl  53% time in target range:  mg/dl   2% time below target range: 70mg/dl     Evaluation   1. High glucose variability  2. Some increased likelihood of hypoglycemia late morning  3. Pattern of afternoon and evening postprandial elevations         Overall glucose control:   HGBA1C:    Lab Results   Component Value Date    A1C 7.3 (H) 04/18/2025    A1C 6.7 08/06/2024    A1C 6.4 (H) 05/07/2024     (H) 04/18/2025        Wt Readings from Last 3 Encounters:   04/21/25 177 lb (80.3 kg)   12/26/24 177 lb (80.3 kg)   05/09/24 167 lb (75.8 kg)     BP Readings from Last 3 Encounters:   04/21/25 120/64   12/26/24 118/70   05/09/24 110/76          Past History:   She  has a past medical history of Anxiety, Diabetes mellitus (HCC) (May 7, 23), Dysmenorrhea, and Type 1 diabetes mellitus (HCC).   Her family history includes Diabetes in her paternal grandmother; Thyroid Disorder in her maternal grandmother.   She  reports that she has never  smoked. She has never used smokeless tobacco. She reports current alcohol use. She reports that she does not use drugs.     She has no known allergies.   Medications Ordered Prior to this Encounter[1]    CMP  (most recent labs)   Lab Results   Component Value Date/Time     (H) 04/18/2025 09:39 AM    BUN 11 04/18/2025 09:39 AM    CREATSERUM 0.98 04/18/2025 09:39 AM    EGFRCR 84 04/18/2025 09:39 AM    CA 10.2 04/18/2025 09:39 AM    ALKPHO 48 (L) 04/18/2025 09:39 AM    AST 21 04/18/2025 09:39 AM    ALT 18 04/18/2025 09:39 AM    BILT 1.2 04/18/2025 09:39 AM    TP 7.6 04/18/2025 09:39 AM    ALB 4.8 04/18/2025 09:39 AM     04/18/2025 09:39 AM    K 4.3 04/18/2025 09:39 AM     04/18/2025 09:39 AM    CO2 27.0 04/18/2025 09:39 AM           Lipids  (most recent labs)   Lab Results   Component Value Date/Time    CHOLEST 233 (H) 04/18/2025 09:39 AM    TRIG 69 04/18/2025 09:39 AM    HDL 85 (H) 04/18/2025 09:39 AM     (H) 04/18/2025 09:39 AM    NONHDLC 148 (H) 04/18/2025 09:39 AM          Diabetes  (most recent labs)   Lab Results   Component Value Date/Time    A1C 7.3 (H) 04/18/2025 09:39 AM          Microalb (most recent labs)   Lab Results   Component Value Date/Time    MICROALBCREA 1.9 04/18/2025 09:39 AM        Lab results reviewed with patient.    REVIEW OF SYSTEMS:   GENERAL HEALTH: feels well otherwise  SKIN: denies any unusual skin lesions or rashes  RESPIRATORY: denies shortness of breath with exertion  CARDIOVASCULAR: denies chest pain on exertion  GI: denies nausea, abdominal pain or heartburn  NEURO: denies headaches and denies numbness and tingling to extremities  ENDO: denies polydipsia, polyuria or polyphagia, denies unexplained weight changes    EXAM:   /64   Pulse 78   Resp 18   Wt 177 lb (80.3 kg)   LMP 10/27/2024 (Exact Date)   BMI 25.40 kg/m²  Estimated body mass index is 25.4 kg/m² as calculated from the following:    Height as of 12/26/24: 5' 10\" (1.778 m).    Weight as  of this encounter: 177 lb (80.3 kg).   Physical Exam  Vitals reviewed.   Constitutional:       Appearance: Normal appearance.   Cardiovascular:      Pulses:           Dorsalis pedis pulses are 2+ on the right side and 2+ on the left side.   Pulmonary:      Effort: Pulmonary effort is normal.   Feet:      Right foot:      Protective Sensation: 5 sites tested.  5 sites sensed.      Skin integrity: Skin integrity normal.      Toenail Condition: Right toenails are normal.      Left foot:      Protective Sensation: 5 sites tested.  5 sites sensed.      Skin integrity: Skin integrity normal.      Toenail Condition: Left toenails are normal.      Comments: Diabetes foot exam performed: Vibration to dorsum to the first toe perceived bilaterally.  Foot care practices reviewed with patient.    Neurological:      Mental Status: She is alert and oriented to person, place, and time.   Psychiatric:         Mood and Affect: Mood normal.         Behavior: Behavior normal.         ASSESSMENT AND PLAN:   As for her Diabetes, it is stable, needs further observation, needs improvement, needs to follow diet more regularly  Recommendations are: continue present meds, see ophthalmology soon, and check feet daily.    Patient will contact insurance to inquire about whether she has high deductible plan and will contact [employer] regarding possible payment options.  OmniPod insulin pump also sent to pharmacy for price check.  Patient will contact office if resumes insulin pump therapy.    Patient to change Lantus to Tresiba 28 units injection daily.  Continue Humalog ISF 1u:40 mg/dl for target of 110 mg/dl and change ICR 1u:12g for breakfast and 1u:10g for lunch and dinner.  Continue Gvoke for treatment of severe hypoglycemia.    Patient to continue to use personal Eversense CGM for glucose monitoring.  Discussed self monitoring blood glucose and the importance of monitoring.  Patient agreed to monitoring qid - before meals and 2 hours  postprandial of one meal per day if not using CGM.    Hypoglycemia S&S, Rx, and when to call APRN/CDE reviewed using Rule of 15's. Stressed need to call if 2 readings below 80 mg/dl in 1 week for medication adjustment.   Use Gvoke for treatment of severe hypoglycemia.    Reinforced healthy eating in healthy portions and increasing daily physical activity.    Per ADA Standards of Care: An ACE inhibitor or an angiotensin receptor blocker is not recommended for the primary prevention of chronic kidney disease in patients with diabetes who have normal blood pressure, normal urinary albumin-to-creatinine ratio (<30 mg/g creatinine), and normal estimated glomerular filtration rate.  Patient will follow up with provider while home from [facility] for holidays in December but is aware to contact office if any questions or concerns.         As for her cholesterol, Lipids are needs further observation, needs improvement, needs to follow diet more regularly.   PLAN: reviewed diet, exercise and weight control.  Discussed ways to improve LDL and total cholesterol levels.      DM Health Maintenance  Last dilated eye exam: No data recorded Exam shows retinopathy? No data recorded  Last diabetic foot exam: Last Foot Exam: 25    Date of last PHQ-2 depression screen: PHQ-2 - Date of last depression screenin2025    Patient  reports that she has never smoked. She has never used smokeless tobacco.  When is flu vaccine due? No recommendations at this time  When is pneumonia vaccine due? No recommendations at this time    The patient indicates understanding of these issues and agrees to the plan.  Refills addressed at time of office visit.    Diagnoses and all orders for this visit:    Type 1 diabetes mellitus without complication (HCC)  -     insulin degludec (TRESIBA FLEXTOUCH) 200 UNIT/ML Subcutaneous Solution Pen-injector; Inject daily as directed up to TDD = 30 units  -     insulin degludec (TRESIBA FLEXTOUCH) 100 UNIT/ML  Subcutaneous Solution Pen-injector; Inject daily as directed up to TDD = 30 units  -     Insulin Disposable Pump (OMNIPOD 5 G7 INTRO, GEN 5,) Does not apply Kit; 1 each one time for 1 dose.  -     Insulin Disposable Pump (OMNIPOD 5 G7 PODS, GEN 5,) Does not apply Misc; 1 each every third day.    Mixed hyperlipidemia       Return in about 35 weeks (around 12/22/2025) for 45 minute appointment, Personal CGM, Insulin pump.    The risks and benefits of my recommendations, as well as other treatment options were discussed with the patient today. Questions were answered to the best of my knowledge.   50 min spent with patient and >50% time spent counseling and coordinating care related to their office visit.      Elena BOWEN, BC-ADM, CDCES         [1]   Current Outpatient Medications on File Prior to Visit   Medication Sig    Insulin Lispro, 1 Unit Dial, (HUMALOG KWIKPEN) 100 UNIT/ML Subcutaneous Solution Pen-injector Inject three times daily with meals via via sliding scale, ICR and correction factor.  TOTAL DAILY DOSE=50 units    Ferrous Sulfate 325 (65 Fe) MG Oral Tab Take by mouth.    Insulin Pen Needle (TRUEPLUS 5-BEVEL PEN NEEDLES) 32G X 4 MM Does not apply Misc Use up to 5 times daily to inject insulin    CONTOUR NEXT TEST In Vitro Strip Use as directed    glucagon (GVOKE HYPOPEN 2-PACK) 1 MG/0.2ML Subcutaneous injection Inject 0.2 mL (1 mg total) into the skin once as needed for Low blood glucose (as needed for treatment of severe hypoglycemia).    Insulin Pen Needle (BD PEN NEEDLE SANDRITA U/F) 32G X 4 MM Does not apply Misc Inject 1 Pen into the skin 4 (four) times daily.    Microlet Lancets Does not apply Misc     Blood Glucose Monitoring Suppl (CONTOUR NEXT EZ) w/Device Does not apply Kit     [DISCONTINUED] insulin lispro (HUMALOG) 100 UNIT/ML Injection Solution Inject via insulin pump as directed up to TDD = 50 units    Continuous Glucose Sensor (DEXCOM G7 SENSOR) Does not apply Misc 1 each Every 10 days.  (Patient not taking: Reported on 4/21/2025)     No current facility-administered medications on file prior to visit.

## 2025-04-24 DIAGNOSIS — E10.9 TYPE 1 DIABETES MELLITUS WITHOUT COMPLICATION (HCC): ICD-10-CM

## 2025-04-25 RX ORDER — ACYCLOVIR 400 MG/1
1 TABLET ORAL
Qty: 3 EACH | Refills: 11 | Status: SHIPPED | OUTPATIENT
Start: 2025-04-25

## 2025-04-25 RX ORDER — INSULIN PMP CART,AUT,G6/7,CNTR
1 EACH SUBCUTANEOUS
Qty: 10 EACH | Refills: 2 | Status: SHIPPED | OUTPATIENT
Start: 2025-04-25

## 2025-04-25 RX ORDER — PEN NEEDLE, DIABETIC 32GX 5/32"
NEEDLE, DISPOSABLE MISCELLANEOUS
Qty: 400 EACH | Refills: 3 | Status: SHIPPED | OUTPATIENT
Start: 2025-04-25

## 2025-04-25 RX ORDER — INSULIN DEGLUDEC 200 U/ML
INJECTION, SOLUTION SUBCUTANEOUS
Qty: 9 ML | Refills: 0 | Status: SHIPPED | OUTPATIENT
Start: 2025-04-25

## 2025-04-25 RX ORDER — INSULIN PMP CART,AUT,G6/7,CNTR
1 EACH SUBCUTANEOUS ONCE
Qty: 1 KIT | Refills: 0 | Status: SHIPPED | OUTPATIENT
Start: 2025-04-25 | End: 2025-04-25

## 2025-04-25 NOTE — TELEPHONE ENCOUNTER
Requested Prescriptions     Pending Prescriptions Disp Refills    Insulin Pen Needle (TRUEPLUS 5-BEVEL PEN NEEDLES) 32G X 4 MM Does not apply Misc 450 each 3     Sig: Use up to 5 times daily to inject insulin    Continuous Glucose Sensor (DEXCOM G7 SENSOR) Does not apply Misc 3 each 11     Si each Every 10 days.    insulin degludec (TRESIBA FLEXTOUCH) 200 UNIT/ML Subcutaneous Solution Pen-injector 3 mL 0     Sig: Inject daily as directed up to TDD = 30 units    Insulin Disposable Pump (OMNIPOD 5 G7 INTRO, GEN 5,) Does not apply Kit 1 kit 0     Si each one time for 1 dose.    Insulin Disposable Pump (OMNIPOD 5 G7 PODS, GEN 5,) Does not apply Misc 10 each 2     Si each every third day.     HGBA1C:    Lab Results   Component Value Date    A1C 7.3 (H) 2025    A1C 6.7 2024    A1C 6.4 (H) 2024     (H) 2025     Your Appointments      2025 1:00 PM  Diabetes Pump follow up with JESSI Acevedo  [facility] [address] [place] [address] ([facility] DI[facility]ET[facility] [place]) [address] [place] [id number] [phone]               Last Office Visit:  2025-DH      Just sent 2025-amanda sent to see if patient has picked up.

## 2025-05-08 ENCOUNTER — PATIENT MESSAGE (OUTPATIENT)
Dept: ENDOCRINOLOGY CLINIC | Facility: CLINIC | Age: 20
End: 2025-05-08

## 2025-05-09 NOTE — TELEPHONE ENCOUNTER
Patient advised issue with insurance information at pharmacy - patient asking for assistance. Okay on supplies right now.     Call placed to pharmacy - pharmacy not open, left voicemail requesting call back/advise what is needed, such as Prior Authorization.     Call placed to pharmacy again to see if they can advise - pharmacy confirmed a Prior Authorization is needed- send/fax request to [phone].   Confirmed fax number is [employer] - pulled insulin pump Prior Authorization request form and faxed to [phone]. Confirmed completed at 10:19 am.

## 2025-05-12 NOTE — TELEPHONE ENCOUNTER
Pump settings emailed and scanned to [employer] reps to train.    Pump settings sent to scanning as well as pump orders folder in [place].

## 2025-05-12 NOTE — TELEPHONE ENCOUNTER
Previous pump settings can be used, will adjust as needed.  Please contact [employer] reps to touch base with patient to arrange virtual training.    Elena BOWEN, BC-ADM, [facility]

## 2025-05-12 NOTE — TELEPHONE ENCOUNTER
Patient requests pump settings, switched from Tandem to Omnipod 5.  Last tandem settings from March upload are below, please confirm if appropriate to continue:    Last office visit 04/2025  Future Appointments   Date Time Provider Department Center   12/22/2025  1:00 PM Elena Hill APRN [facility]     Last A1c value was 7.3% done 4/18/2025.

## 2025-05-14 ENCOUNTER — PATIENT MESSAGE (OUTPATIENT)
Dept: ENDOCRINOLOGY CLINIC | Facility: CLINIC | Age: 20
End: 2025-05-14

## 2025-05-14 DIAGNOSIS — E10.9 TYPE 1 DIABETES MELLITUS WITHOUT COMPLICATION (HCC): ICD-10-CM

## 2025-05-14 NOTE — TELEPHONE ENCOUNTER
-----------------------------  Dexcom Clarity  -----------------------------  Summer Tye    YOB: 2005    Generated at: Wed, May 14, 2025 12:22 PM CDT    Reporting period: Thu May 1, 2025 - Wed May 14, 2025  -----------------------------  Glucose Details    Average glucose: 166 mg/dL    GMI: N/A    Standard deviation: 52 mg/dL    Coefficient of Variation: 31.2%  -----------------------------  Time in Range    Very High: 8%    High: 30%    In Range: 62%    Low: 0%    Very Low: <1%    Target Range   mg/dL    -----------------------------  Sensor usage    Days with data: 5/14    Time active: 88%    Avg. calibrations per day: 0.2  -----------------------------  Insulin (daily averages)    Long-acting / Fast-acting ratio:  73% / 27%    Long-acting / Fast-acting ratio:  18.7 units / 7.0 units    Total daily dose:  25.7 units/day    # Fast-acting doses/day:  1.3 doses/day    -----------------------------

## 2025-05-15 ENCOUNTER — PATIENT MESSAGE (OUTPATIENT)
Dept: ENDOCRINOLOGY CLINIC | Facility: CLINIC | Age: 20
End: 2025-05-15

## 2025-05-15 RX ORDER — INSULIN PMP CART,AUT,G6/7,CNTR
1 EACH SUBCUTANEOUS ONCE
Qty: 1 KIT | Refills: 0 | Status: SHIPPED | OUTPATIENT
Start: 2025-05-15 | End: 2025-05-15

## 2025-05-15 NOTE — TELEPHONE ENCOUNTER
Noted, no further instructions at this time.    Elena BOWEN, BC-ADM, [facility]

## 2025-05-15 NOTE — TELEPHONE ENCOUNTER
Requested Prescriptions     Pending Prescriptions Disp Refills    Insulin Disposable Pump (OMNIPOD 5 G7 INTRO, GEN 5,) Does not apply Kit 1 kit 0     Si each one time for 1 dose.     HGBA1C:    Lab Results   Component Value Date    A1C 7.3 (H) 2025    A1C 6.7 2024    A1C 6.4 (H) 2024     (H) 2025     Your Appointments      2025 1:00 PM  Diabetes Pump follow up with JESSI Acevedo  [facility] [address] ([facility] [place]) [address] [place] [id number] [phone]               Last Office Visit:   2025-    Last Refill:  2025-10 each with 2 refills-

## 2025-05-16 ENCOUNTER — PATIENT MESSAGE (OUTPATIENT)
Dept: ENDOCRINOLOGY CLINIC | Facility: CLINIC | Age: 20
End: 2025-05-16

## 2025-05-16 NOTE — TELEPHONE ENCOUNTER
Patient advised she has picked up Omni Pod supplies - email sent to Merry to schedule appointment (see TE/patient message dated 5/8/25)

## 2025-06-20 ENCOUNTER — PATIENT MESSAGE (OUTPATIENT)
Dept: ENDOCRINOLOGY CLINIC | Facility: CLINIC | Age: 20
End: 2025-06-20

## 2025-06-23 NOTE — TELEPHONE ENCOUNTER
Epic noted that Novolog may be preferred.    Last office visit 04/2025  Future Appointments   Date Time Provider Department Center   12/22/2025  1:00 PM Elena Hill APRN [facility]     Last A1c value was 7.3% done 4/18/2025.

## 2025-06-24 RX ORDER — INSULIN LISPRO 100 [IU]/ML
INJECTION, SOLUTION INTRAVENOUS; SUBCUTANEOUS
Qty: 40 ML | Refills: 2 | Status: SHIPPED | OUTPATIENT
Start: 2025-06-24

## 2025-07-08 DIAGNOSIS — E10.9 TYPE 1 DIABETES MELLITUS WITHOUT COMPLICATION (HCC): ICD-10-CM

## 2025-07-08 RX ORDER — INSULIN PMP CART,AUT,G6/7,CNTR
1 EACH SUBCUTANEOUS
Qty: 10 EACH | Refills: 2 | Status: SHIPPED | OUTPATIENT
Start: 2025-07-08

## 2025-07-08 RX ORDER — ACYCLOVIR 400 MG/1
1 TABLET ORAL
Qty: 3 EACH | Refills: 11 | Status: SHIPPED | OUTPATIENT
Start: 2025-07-08

## 2025-07-08 NOTE — TELEPHONE ENCOUNTER
Requested Prescriptions     Pending Prescriptions Disp Refills    Continuous Glucose Sensor (DEXCOM G7 SENSOR) Does not apply Misc 3 each 11     Si each Every 10 days.    Insulin Disposable Pump (OMNIPOD 5 G7 PODS, GEN 5,) Does not apply Misc 10 each 2     Si each every third day.     HGBA1C:    Lab Results   Component Value Date    A1C 7.3 (H) 2025    A1C 6.7 2024    A1C 6.4 (H) 2024     (H) 2025     Your Appointments      2025 1:00 PM  Diabetes Pump follow up with JESSI Acevedo  [facility] [address] [place] [address] ([facility] [place]) [address] [facility] [id number] [phone]               Last Office Visit:      Last Refill:

## 2025-07-11 ENCOUNTER — TELEMEDICINE (OUTPATIENT)
Dept: TELEHEALTH | Age: 20
End: 2025-07-11
Payer: COMMERCIAL

## 2025-07-11 DIAGNOSIS — B37.9 ANTIBIOTIC-INDUCED YEAST INFECTION: Primary | ICD-10-CM

## 2025-07-11 DIAGNOSIS — T36.95XA ANTIBIOTIC-INDUCED YEAST INFECTION: Primary | ICD-10-CM

## 2025-07-11 PROCEDURE — 98005 SYNCH AUDIO-VIDEO EST LOW 20: CPT | Performed by: PHYSICIAN ASSISTANT

## 2025-07-11 RX ORDER — FLUCONAZOLE 150 MG/1
TABLET ORAL
Qty: 2 TABLET | Refills: 0 | Status: SHIPPED | OUTPATIENT
Start: 2025-07-11

## 2025-07-11 NOTE — PROGRESS NOTES
Virtual/Telephone Check-In    Summer Gamble verbally consents to a Virtual/Telephone Check-In service on 07/11/25.  Patient has been referred to the [facility] website at [email or web address] to review the yearly Consent to Treat document.  Patient understands and accepts financial responsibility for any deductible, co-insurance and/or co-pays associated with this service.       Telehealth Verbal Consent   I conducted a telehealth visit with Summer Gamble today, 07/11/25, which was completed using two-way, real-time interactive audio and video communication. This has been done in good harini to provide continuity of care in the best interest of the provider-patient relationship, due to the COVID -19 public health crisis/national emergency where restrictions of face-to-face office visits are ongoing. Every conscious effort was taken to allow for sufficient and adequate time to complete the visit.  The patient was made aware of the limitations of the telehealth visit, including treatment limitations as no physical exam could be performed.  The patient was advised to call 911 or to go to the ER in case there was an emergency.  The patient was also advised of the potential privacy & security concerns related to the telehealth platform.   The patient was made aware of where to find [facility]'s notice of privacy practices, telehealth consent form and other related consent forms and documents.  which are located on the [facility] website. The patient verbally agreed to telehealth consent form, related consents and the risks discussed.    Lastly, the patient confirmed that they were in [place].   Included in this visit, time may have been spent reviewing labs, medications, radiology tests and decision making. Appropriate medical decision-making and tests are ordered as detailed in the plan of care above.  Coding/billing information is submitted for this visit based on complexity of care and/or time spent for the visit.    CHIEF  COMPLAINT:  Chief Complaint   Patient presents with    Yeast Infection       HPI:  Summer Gamble is a 19 year old female who presents for a video visit.  Patient reports treated with vaginal abx for BV one week ago. Recently diagnosed with chlamydia and started on Doxy 4 days ago. Now has vaginal itching and thick white clumpy vaginal discharge. Reports mild vag burning  Patient denies fever, chills, abd pain, pelvic pain, fishy odor or urinary symptoms.      Current Medications[1]  Past Medical History[2]  Past Surgical History[3]    Short Social Hx on File[4]     REVIEW OF SYSTEMS:  GENERAL: ok appetite  SKIN: no rashes or abnormal skin lesions  GI: denies N/V/C or abdominal pain    EXAM:  General: Alert, Well-appearing, and In no acute distress  Respiratory:   Speaking in full sentences comfortably  Normal work of breathing  Head: Normocephalic  Nose: No obvious nasal discharge.  Skin: No obvious rashes or lesions from what observed.     No results found for this or any previous visit (from the past 24 hours).    ASSESSMENT AND PLAN:  Summer Gamble is a 19 year old female who presents with symptoms that are consistent with    ASSESSMENT:   Encounter Diagnosis   Name Primary?    Antibiotic-induced yeast infection Yes       PLAN: Likely abx induced yeast vaginitis. Meds as below.  See patient Instructions    Meds & Refills for this Visit:  Requested Prescriptions     Signed Prescriptions Disp Refills    fluconazole 150 MG Oral Tab 2 tablet 0     Sig: Take one pill PO once. If symptoms persists after 72 hours, may take 2nd tablet       Risks, benefits, and side effects of medication explained and discussed.    There are no Patient Instructions on file for this visit.    The patient indicates understanding of these issues and agrees to the plan.  The patient is asked to f/u with PCP or GYN if sx's persist or worsen.    Summer Gamble understands video visit evaluation is not a substitute for face-to-face  examination or emergency care. Patient advised to go to ER or call 911 for worsening symptoms or acute distress.           [1]   Current Outpatient Medications   Medication Sig Dispense Refill    fluconazole 150 MG Oral Tab Take one pill PO once. If symptoms persists after 72 hours, may take 2nd tablet 2 tablet 0    Continuous Glucose Sensor (DEXCOM G7 SENSOR) Does not apply Misc 1 each Every 10 days. 3 each 11    Insulin Disposable Pump (OMNIPOD 5 G7 PODS, GEN 5,) Does not apply Misc 1 each every third day. 10 each 2    insulin lispro (HUMALOG) 100 UNIT/ML Injection Solution Uses up to 50 units daily via insulin pump 40 mL 2    Insulin Disposable Pump (OMNIPOD 5 G7 INTRO, GEN 5,) Does not apply Kit 1 each one time for 1 dose. 1 kit 0    Insulin Pen Needle (TRUEPLUS 5-BEVEL PEN NEEDLES) 32G X 4 MM Does not apply Misc Use up to 4 times daily to inject insulin 400 each 3    insulin degludec (TRESIBA FLEXTOUCH) 200 UNIT/ML Subcutaneous Solution Pen-injector Inject daily as directed up to TDD = 30 units 9 mL 0    insulin degludec (TRESIBA FLEXTOUCH) 100 UNIT/ML Subcutaneous Solution Pen-injector Inject daily as directed up to TDD = 30 units 15 mL 1    Insulin Lispro, 1 Unit Dial, (HUMALOG KWIKPEN) 100 UNIT/ML Subcutaneous Solution Pen-injector Inject three times daily with meals via via sliding scale, ICR and correction factor.  TOTAL DAILY DOSE=50 units 45 mL 1    Ferrous Sulfate 325 (65 Fe) MG Oral Tab Take by mouth.      CONTOUR NEXT TEST In Vitro Strip Use as directed 100 strip 0    glucagon (GVOKE HYPOPEN 2-PACK) 1 MG/0.2ML Subcutaneous injection Inject 0.2 mL (1 mg total) into the skin once as needed for Low blood glucose (as needed for treatment of severe hypoglycemia). 0.4 mL 0    Insulin Pen Needle (BD PEN NEEDLE SANDRITA U/F) 32G X 4 MM Does not apply Misc Inject 1 Pen into the skin 4 (four) times daily. 400 each 0    Microlet Lancets Does not apply Misc       Blood Glucose Monitoring Suppl (CONTOUR NEXT EZ)  w/Device Does not apply Kit      [2]   Past Medical History:   Anxiety    I was just started on lexapro recently    Diabetes mellitus (HCC)    Dysmenorrhea    Super crampy day 1 and 2 like bed bound    Type 1 diabetes mellitus (HCC)   [3] No past surgical history on file.  [4]   Social History  Socioeconomic History    Marital status: Single   Tobacco Use    Smoking status: Never    Smokeless tobacco: Never   Vaping Use    Vaping status: Never Used   Substance and Sexual Activity    Alcohol use: Yes     Comment: occ    Drug use: No    Sexual activity: Yes     Partners: Male     Birth control/protection: OCP   Other Topics Concern    Caffeine Concern No    Stress Concern No    Weight Concern No    Special Diet No    Exercise No    Seat Belt No

## 2025-07-25 RX ORDER — ACYCLOVIR 400 MG/1
1 TABLET ORAL
Qty: 3 EACH | Refills: 11 | Status: SHIPPED | OUTPATIENT
Start: 2025-07-25

## 2025-07-25 RX ORDER — INSULIN LISPRO 100 [IU]/ML
INJECTION, SOLUTION INTRAVENOUS; SUBCUTANEOUS
Qty: 40 ML | Refills: 2 | Status: SHIPPED | OUTPATIENT
Start: 2025-07-25

## 2025-07-25 NOTE — TELEPHONE ENCOUNTER
Requested Prescriptions     Pending Prescriptions Disp Refills    insulin lispro (HUMALOG) 100 UNIT/ML Injection Solution 40 mL 2     Sig: Uses up to 50 units daily via insulin pump    Continuous Glucose Sensor (DEXCOM G7 SENSOR) Does not apply Misc 3 each 11     Si each Every 10 days.     Your appointments       Date & Time Appointment Department ([place])    Dec 22, 2025 1:00 PM [facility] Diabetes Pump follow up with Elena Hill APRN [facility] [address] ([facility] [place])              [facility] [address] [facility] [place]  [address] [facility] [id number] [phone]           Last A1c value was 7.3% done 2025.   Last office visit: 25 -   Last refill: [address] [facility]

## 2025-08-04 ENCOUNTER — PATIENT MESSAGE (OUTPATIENT)
Dept: ENDOCRINOLOGY CLINIC | Facility: CLINIC | Age: 20
End: 2025-08-04

## 2025-08-19 ENCOUNTER — TELEPHONE (OUTPATIENT)
Dept: ENDOCRINOLOGY CLINIC | Facility: CLINIC | Age: 20
End: 2025-08-19

## (undated) NOTE — LETTER
Name:  Michel Holt Year:  10th Grade Class: Student ID No.:   Address:  [facility]  [address] [address] Phone:  [phone] (home)  :  11year old   Name Relationship [address] Work Phone Home Phone Mobile Phone   1.  [employer] Lonnie polymorphic ventricular tachycardia? 13. Does anyone in your family have a heart problem, pacemaker, or implanted defibrillator? 12. Has anyone in your family had unexplained fainting, seizures, or near drowning?      BONE AND JOINT QUESTIONS Yes No 38. Have you ever had numbness, tingling, or weakness in your arms or legs after being hit or falling? 39.Have you ever been unable to move your arms / legs after being hit /fall? 40. Have you ever become ill while exercising in the heat?     41.  D Eyes/Ears/Nose/Throat:  Pupils equal    Hearing Yes    Lymph nodes Yes    Heart*  · Murmurs (auscultation standing, supine, +/- Valsalva)  · Location of point of maximal impulse (PMI) Yes    Pulses Yes    Lungs Yes    Abdomen Yes    Genitourinary (males on defined in the [facility] Performance-Enhancing Substance Testing Program Protocol.  We have reviewed the policy and understand that I/our student may be asked to submit to testing for the presence of performance-enhancing substances in my/his/her body either dur

## (undated) NOTE — LETTER
24    Summer Gamble   12/10/2005      To Whom It May Concern:     Summer Gamble  is under my care for the management of her type 1 diabetes mellitus.  She requires insulin for the management of her diabetes and uses a continuous glucose monitor to monitor her glucose closely.  Summer's last A1c% was 6.7% which indicates that her glucose management is stable and at goal.  From a diabetes management standpoint Summer is cleared to participate in sports.    If you have any additional questions or concerns please contact the office at [phone].      Regards,          Elena Hill MSN, APRN, FNP-BC, BC-ADM,CDCES

## (undated) NOTE — LETTER
6/2/2023    Smita Gamble  [address] [place] [facility] [address]    Subject: Diabetes and Traveling     To Whom It May Concern:     Larry Burgos  is under my care for diabetes and requires insulin medication for the management of her diabetes. She will be traveling with insulin, pen needles and additional supplies. She currently uses a device on the skin called a Continuous Glucose Monitoring System. It cannot be removed from the patient. TSA full body scans or security magnetometers can cause insulin pump or glucose monitor malfunction. Patients who are currently on an insulin pump or glucose monitoring system should have a pat-down or metal detector check instead. Low blood sugar is also a possibility which would require treatment with glucose tablets, juice boxes or other acceptable substitutes in order to increase the blood sugar back up to a safe level. Several juice boxes must be available on the airplane and are only commercially available in 4 ounce size or larger. Therefore, it is essential that she have in possession at all times: glucose tablets, juice boxes or other acceptable substitutes in the event a low blood sugar occurs. In addition to  the above items, the patient must have at all times the following items for patient safety:  Lancet device, lancets, glucometer  Testing strips  Alcohol swabs  Glucagon emergency kit  Glucose replacement (juice boxes or tabs) in the event of hypoglycemia (low blood sugar)   The above items cannot be stored in the checked luggage; they must be with the patient in carry-on luggage to avoid instability and access. If you have any questions regarding this patient and her medical care and requirements, please contact my office at [phone].   Sincerely,          JESSI De La Rosa

## (undated) NOTE — LETTER
Date: 2/17/2021    Patient Name: Alex Betancur          To Whom it may concern: This letter has been written at the patient's request. The above patient was seen at the [facility] for treatment of a medical condition.     Tez Melvin may return to

## (undated) NOTE — LETTER
2023  Re: Teri Whitley  : 12/10/2005        To whom it may concern,    Teri Whitley is newly diagnosed with type 1 diabetes. She has started on a multiple insulin injection regimen which includes basal insulin, mealtime insulin before each meal, and correction dose insulin for hyperglycemia. She has meet with the diabetes educator for diabetes education and carbohydrate counting. She is proficient with counting carbohydrates for meals and she has done well adjusting to this life-changing diagnosis. The standard of care for management Type 1 Diabetes includes insulin pump therapy with continuous glucose monitoring (CGM). Sheyla Cochran has already started on the Dexcom CGM and has displayed exceptional proficiency in the basal/bolus insulin regimen. I recommend that she start on insulin pump therapy right away. 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 After review and discussion, she has decided to start on the Tslim X2 with Control IQ technology. This Tslim X2 insulin pump is integrated with the Dexcom and uses the sensor readings to predict and prevent hypoglycemia and hyperglycemia. I recommend that Teri Whitley start on this potentially life-saving technology without delay. Please approve the Tslim X2 with Control IQ. Please contact me directly with any questions.          Sincerely,          Shaunna BOWEN, BC-, KARENES

## (undated) NOTE — ED AVS SNAPSHOT
Parent/Legal Guardian Access to the Online Connected Record of a Patient 15to 16Years Old  Return completed form by Secure email to [place] HIM/Medical Records Department: [email or web address].     Requirements and Procedures   Under [facility] ·  I understand that [address] is intended as a secure online source of confidential medical information.  If I share my MyChart ID and password with another person, that person may be able to view my or my child’s health information, and health information a · This form does not substitute as an Authorization to Release health information to a designated proxy by any other method. The purpose of this Minor Proxy form is for access to the [employer] portal information.     By signing below, I acknowledge that I ha I have read and understand the requirements and procedures for accessing my child’s medical record information online as provided  on page one of this document titled, Parent/Legal Guardian Access to the Online MyChart Record of a Patient 12 to [address]

## (undated) NOTE — ED AVS SNAPSHOT
[facility] in [address] [place] [address] Drive,[address] Floor    [address] [facility]    Phone:  [phone]    Fax:  Wu   MRN: [id number]    Department:  [facility]   Date of Visit:  6/19/2017 may not be covered by your plan. Please contact your insurance company to determine coverage for follow-up care and referrals. [facility]  [address] [facility], [address] [place] [address]  [phone] [address]  [address] prescription right away and begin taking the medication(s) as directed. If the Immediate Care Provider has read X-rays, these will be re-interpreted by a radiologist.  If there is a significant change in your reading, you will be contacted.  Please make Medicaid plans. To get signed up and covered, please call [phone] and ask to get set up for an insurance coverage that is in-network with Matthew Leach. Moveratiwilliams     Sign up for creads access for your child.   creads access allows y

## (undated) NOTE — LETTER
Name:  Lindsey Soriano Year:   Class: Student ID No.:   Address:  [place]  [address] Phone:  [phone] (home)  :  14year old   Name Relationship [address] Work Phone Home Phone Mobile Phone   1.  [facility] Mother  [phone] Does anyone in your family have a heart problem, pacemaker, or implanted defibrillator? 12. Has anyone in your family had unexplained fainting, seizures, or near drowning?      BONE AND JOINT QUESTIONS Yes No   17. Have you ever had an injury to a bone, hit or falling? 39.Have you ever been unable to move your arms / legs after being hit /fall? 40. Have you ever become ill while exercising in the heat?     41. Do you get frequent muscle cramps when exercising? 42.  Do you or someone in your fam (auscultation standing, supine, +/- Valsalva)  · Location of point of maximal impulse (PMI) Yes    Pulses Yes    Lungs Yes    Abdomen Yes    Genitourinary (males only)*     Skin:  HSV, lesions suggestive of MRSA, tinea corporis Yes    Neurologic* Yes    MU submit to testing for the presence of performance-enhancing substances in my/his/her body either during [employer] state series events or during the school day, and I/our student do/does hereby agree to submit to such testing and analysis by a certified Zenaida Griggs

## (undated) NOTE — LETTER
[facility] [employer] of Child Health Examination       Student's Name  Jennie Moreira Birth Date Title                           Date     Signature                                                                                                                                              Title                           Date    (I CARE PROVIDER    ALLERGIES  (Food, drug, insect, other) MEDICATION  (List all prescribed or taken on a regular basis.)     Diagnosis of asthma?   Child wakes during the night coughing   Yes   No    Yes   No    Loss of function of one of paired organs? (eye/ following:  Family History No   Ethnic Minority  No          Signs of Insulin Resistance (hypertension, dyslipidemia, polycystic ovarian syndrome, acanthosis nigricans)    No           At Risk  No   Lead Risk Questionnaire  Req'd for children 6 months thru inhaled corticosteroid):   No Other   NEEDS/MODIFICATIONS required in the school setting  None DIETARY Needs/Restrictions     None   SPECIAL INSTRUCTIONS/DEVICES e.g. safety glasses, glass eye, chest protector for arrhythmia, pacemaker, prosthetic device,

## (undated) NOTE — MR AVS SNAPSHOT
[facility] [address] [facility] [address] [facility]  [place] [address] [phone]               Thank you for choosing us for your health care visit with SHAWN Guzman. We are glad to serve you and happy to provide you with this summary of your visit.   Please h · Life at home. How is your child’s behavior? Does he or she get along with others in the family? Is he or she respectful of you, other adults, and authority?  Does your child participate in family events, or does he or she withdraw from other family member This time can be broken up throughout the day. After-school sports, dance or martial arts classes, riding a bike, or even walking to school or a friend’s house counts as activity.    · Limit “screen time” to 1 hour to 2 hours each day.  This includes time s Sleeping tips  During the teen years, sleep patterns may change. Many teenagers have a hard time falling asleep, which can lead to sleeping late the next morning.  Here are some tips to help your teen get the rest he or she needs:  · Encourage your teen to · Set rules and limits around driving and use of the car. If your teen gets a ticket or has an accident, there should be consequences. Driving is a privilege that can be taken away if your child doesn’t follow the rules.   · Teach your child to make good de 00567. All rights reserved. This information is not intended as a substitute for professional medical care. Always follow your healthcare professional's instructions.         Your Scoliosis Evaluation  If a school screening or your healthcare provider finds 68\" (98 %*, Z = 2.15) 133 lb (88 %*, Z = 1.16) 20.23 kg/m2 (67 %*, Z = 0.43)    Breastfeeding?           No      *Growth percentiles are based on CDC 2-20 Years data     BP percentiles are based on 2000 NHANES data         Current Medications      Notice o Create a home where healthy choices are available and encouraged  o Make it fun – find ways to engage your children such as:  o playing a game of tag  o cooking healthy meals together  o creating a rainbow shopping list to find colorful fruits and vegeta

## (undated) NOTE — Clinical Note
I saw Elise Shone in the walk in clinic today for sports physical, she was cleared with limitation to follow up with ortho due to possible increased scoliosis-was followed by Dr. Cathy Aceves and patient has grown. She was advised to follow up with you as well.  Thank y

## (undated) NOTE — LETTER
[facility] [employer] of Child Health Examination       Student's Name  Kaleb Villalobos Birth Date Title                           Date     Signature                                                                                                                                              Title                           D HEALTH CARE PROVIDER    ALLERGIES  (Food, drug, insect, other) MEDICATION  (List all prescribed or taken on a regular basis.)     Diagnosis of asthma?   Child wakes during the night coughing   Yes   No    Yes   No    Loss of function of one of paired organs the following:  Family History No   Ethnic Minority  No          Signs of Insulin Resistance (hypertension, dyslipidemia, polycystic ovarian syndrome, acanthosis nigricans)    No           At Risk  No   Lead Risk Questionnaire  Req'd for children 6 months (e.g. inhaled corticosteroid):   No Other   NEEDS/MODIFICATIONS required in the school setting  None DIETARY Needs/Restrictions     None   SPECIAL INSTRUCTIONS/DEVICES e.g. safety glasses, glass eye, chest protector for arrhythmia, pacemaker, prosthetic de

## (undated) NOTE — LETTER
[facility] 55 Examination       Student's Name  Liyah Crow Birth Date Title                           Date     Signature HEALTH HISTORY          TO BE COMPLETED AND SIGNED BY PARENT/GUARDIAN AND VERIFIED BY HEALTH CARE PROVIDER    ALLERGIES  (Food, drug, insect, other)  Motrin [Ibuprofen] MEDICATION  (List all prescribed or taken on a regular basis.)  No current outpatient p /68   Pulse 72   Temp 98.4 °F (36.9 °C) (Oral)   Resp 20   Ht 71\"   Wt 146 lb   LMP 07/19/2018   SpO2 98%   BMI 20.36 kg/m²     DIABETES SCREENING  BMI>85% age/sex  No And any two of the following:  Family History No    Ethnic Minority  No Respiratory Yes                   Diagnosis of Asthma: No Mental Health Yes        Currently Prescribed Asthma Medication:            Quick-relief  medication (e.g. Short Acting Beta Antagonist): No          Controller medication (e.g. inhaled corticostero